# Patient Record
Sex: MALE | Race: WHITE | Employment: OTHER | URBAN - METROPOLITAN AREA
[De-identification: names, ages, dates, MRNs, and addresses within clinical notes are randomized per-mention and may not be internally consistent; named-entity substitution may affect disease eponyms.]

---

## 2020-02-19 ENCOUNTER — APPOINTMENT (OUTPATIENT)
Dept: GENERAL RADIOLOGY | Age: 85
DRG: 280 | End: 2020-02-19
Attending: EMERGENCY MEDICINE
Payer: MEDICARE

## 2020-02-19 ENCOUNTER — HOSPITAL ENCOUNTER (INPATIENT)
Age: 85
LOS: 6 days | Discharge: NURSING, REHAB OR LONG-TERM CARE | DRG: 280 | End: 2020-02-25
Attending: EMERGENCY MEDICINE | Admitting: INTERNAL MEDICINE
Payer: MEDICARE

## 2020-02-19 ENCOUNTER — APPOINTMENT (OUTPATIENT)
Dept: CT IMAGING | Age: 85
DRG: 280 | End: 2020-02-19
Attending: EMERGENCY MEDICINE
Payer: MEDICARE

## 2020-02-19 DIAGNOSIS — M62.82 NON-TRAUMATIC RHABDOMYOLYSIS: ICD-10-CM

## 2020-02-19 DIAGNOSIS — I21.4 NSTEMI (NON-ST ELEVATED MYOCARDIAL INFARCTION) (HCC): Primary | ICD-10-CM

## 2020-02-19 DIAGNOSIS — J18.9 COMMUNITY ACQUIRED PNEUMONIA OF RIGHT LOWER LOBE OF LUNG: ICD-10-CM

## 2020-02-19 LAB
ALBUMIN SERPL-MCNC: 3.5 G/DL (ref 3.5–5)
ALBUMIN/GLOB SERPL: 0.8 {RATIO} (ref 1.1–2.2)
ALP SERPL-CCNC: 95 U/L (ref 45–117)
ALT SERPL-CCNC: 53 U/L (ref 12–78)
AMORPH CRY URNS QL MICRO: ABNORMAL
ANION GAP SERPL CALC-SCNC: 6 MMOL/L (ref 5–15)
APPEARANCE UR: ABNORMAL
APTT PPP: 24.9 SEC (ref 22.1–32)
AST SERPL-CCNC: 135 U/L (ref 15–37)
BACTERIA URNS QL MICRO: NEGATIVE /HPF
BASOPHILS # BLD: 0 K/UL (ref 0–0.1)
BASOPHILS NFR BLD: 0 % (ref 0–1)
BILIRUB SERPL-MCNC: 0.5 MG/DL (ref 0.2–1)
BILIRUB UR QL: NEGATIVE
BUN SERPL-MCNC: 38 MG/DL (ref 6–20)
BUN/CREAT SERPL: 31 (ref 12–20)
CALCIUM SERPL-MCNC: 9.3 MG/DL (ref 8.5–10.1)
CHLORIDE SERPL-SCNC: 100 MMOL/L (ref 97–108)
CK SERPL-CCNC: 3805 U/L (ref 39–308)
CO2 SERPL-SCNC: 27 MMOL/L (ref 21–32)
COLOR UR: ABNORMAL
CREAT SERPL-MCNC: 1.24 MG/DL (ref 0.7–1.3)
DIFFERENTIAL METHOD BLD: ABNORMAL
EOSINOPHIL # BLD: 0 K/UL (ref 0–0.4)
EOSINOPHIL NFR BLD: 0 % (ref 0–7)
EPITH CASTS URNS QL MICRO: ABNORMAL /LPF
ERYTHROCYTE [DISTWIDTH] IN BLOOD BY AUTOMATED COUNT: 14.4 % (ref 11.5–14.5)
GLOBULIN SER CALC-MCNC: 4.3 G/DL (ref 2–4)
GLUCOSE SERPL-MCNC: 157 MG/DL (ref 65–100)
GLUCOSE UR STRIP.AUTO-MCNC: 250 MG/DL
GRAN CASTS URNS QL MICRO: ABNORMAL /LPF
HCT VFR BLD AUTO: 36 % (ref 36.6–50.3)
HGB BLD-MCNC: 12.2 G/DL (ref 12.1–17)
HGB UR QL STRIP: ABNORMAL
IMM GRANULOCYTES # BLD AUTO: 0 K/UL (ref 0–0.04)
IMM GRANULOCYTES NFR BLD AUTO: 0 % (ref 0–0.5)
KETONES UR QL STRIP.AUTO: 15 MG/DL
LEUKOCYTE ESTERASE UR QL STRIP.AUTO: ABNORMAL
LIPASE SERPL-CCNC: 157 U/L (ref 73–393)
LYMPHOCYTES # BLD: 0.6 K/UL (ref 0.8–3.5)
LYMPHOCYTES NFR BLD: 4 % (ref 12–49)
MCH RBC QN AUTO: 30.1 PG (ref 26–34)
MCHC RBC AUTO-ENTMCNC: 33.9 G/DL (ref 30–36.5)
MCV RBC AUTO: 88.9 FL (ref 80–99)
MONOCYTES # BLD: 0.8 K/UL (ref 0–1)
MONOCYTES NFR BLD: 5 % (ref 5–13)
NEUTS BAND NFR BLD MANUAL: 2 %
NEUTS SEG # BLD: 13.9 K/UL (ref 1.8–8)
NEUTS SEG NFR BLD: 89 % (ref 32–75)
NITRITE UR QL STRIP.AUTO: NEGATIVE
NRBC # BLD: 0 K/UL (ref 0–0.01)
NRBC BLD-RTO: 0 PER 100 WBC
PH UR STRIP: 5.5 [PH] (ref 5–8)
PLATELET # BLD AUTO: 344 K/UL (ref 150–400)
PMV BLD AUTO: 9.3 FL (ref 8.9–12.9)
POTASSIUM SERPL-SCNC: 3.8 MMOL/L (ref 3.5–5.1)
PROT SERPL-MCNC: 7.8 G/DL (ref 6.4–8.2)
PROT UR STRIP-MCNC: 300 MG/DL
RBC # BLD AUTO: 4.05 M/UL (ref 4.1–5.7)
RBC #/AREA URNS HPF: ABNORMAL /HPF
RBC MORPH BLD: ABNORMAL
SODIUM SERPL-SCNC: 133 MMOL/L (ref 136–145)
SP GR UR REFRACTOMETRY: 1.02 (ref 1–1.03)
THERAPEUTIC RANGE,PTTT: NORMAL SECS (ref 58–77)
TROPONIN I SERPL-MCNC: 6.5 NG/ML
UA: UC IF INDICATED,UAUC: ABNORMAL
UROBILINOGEN UR QL STRIP.AUTO: 0.2 EU/DL (ref 0.2–1)
WBC # BLD AUTO: 15.3 K/UL (ref 4.1–11.1)
WBC URNS QL MICRO: ABNORMAL /HPF

## 2020-02-19 PROCEDURE — 82550 ASSAY OF CK (CPK): CPT

## 2020-02-19 PROCEDURE — 81001 URINALYSIS AUTO W/SCOPE: CPT

## 2020-02-19 PROCEDURE — 70450 CT HEAD/BRAIN W/O DYE: CPT

## 2020-02-19 PROCEDURE — 74011250636 HC RX REV CODE- 250/636: Performed by: EMERGENCY MEDICINE

## 2020-02-19 PROCEDURE — 71046 X-RAY EXAM CHEST 2 VIEWS: CPT

## 2020-02-19 PROCEDURE — 80053 COMPREHEN METABOLIC PANEL: CPT

## 2020-02-19 PROCEDURE — 96365 THER/PROPH/DIAG IV INF INIT: CPT

## 2020-02-19 PROCEDURE — 93005 ELECTROCARDIOGRAM TRACING: CPT

## 2020-02-19 PROCEDURE — 94761 N-INVAS EAR/PLS OXIMETRY MLT: CPT

## 2020-02-19 PROCEDURE — 83690 ASSAY OF LIPASE: CPT

## 2020-02-19 PROCEDURE — 85025 COMPLETE CBC W/AUTO DIFF WBC: CPT

## 2020-02-19 PROCEDURE — 85730 THROMBOPLASTIN TIME PARTIAL: CPT

## 2020-02-19 PROCEDURE — 65270000029 HC RM PRIVATE

## 2020-02-19 PROCEDURE — 73562 X-RAY EXAM OF KNEE 3: CPT

## 2020-02-19 PROCEDURE — 74011250637 HC RX REV CODE- 250/637: Performed by: EMERGENCY MEDICINE

## 2020-02-19 PROCEDURE — 99285 EMERGENCY DEPT VISIT HI MDM: CPT

## 2020-02-19 PROCEDURE — 84484 ASSAY OF TROPONIN QUANT: CPT

## 2020-02-19 PROCEDURE — 36415 COLL VENOUS BLD VENIPUNCTURE: CPT

## 2020-02-19 RX ORDER — ASPIRIN 325 MG
325 TABLET ORAL
Status: COMPLETED | OUTPATIENT
Start: 2020-02-19 | End: 2020-02-19

## 2020-02-19 RX ORDER — ACETAMINOPHEN 500 MG
1000 TABLET ORAL ONCE
Status: COMPLETED | OUTPATIENT
Start: 2020-02-19 | End: 2020-02-19

## 2020-02-19 RX ORDER — HEPARIN SODIUM 10000 [USP'U]/100ML
12-25 INJECTION, SOLUTION INTRAVENOUS
Status: DISCONTINUED | OUTPATIENT
Start: 2020-02-19 | End: 2020-02-21

## 2020-02-19 RX ORDER — SODIUM CHLORIDE 9 MG/ML
100 INJECTION, SOLUTION INTRAVENOUS ONCE
Status: COMPLETED | OUTPATIENT
Start: 2020-02-19 | End: 2020-02-19

## 2020-02-19 RX ORDER — HEPARIN SODIUM 5000 [USP'U]/ML
4000 INJECTION, SOLUTION INTRAVENOUS; SUBCUTANEOUS AS NEEDED
Status: DISCONTINUED | OUTPATIENT
Start: 2020-02-20 | End: 2020-02-21

## 2020-02-19 RX ORDER — ACETAMINOPHEN 500 MG
1000 TABLET ORAL
Status: COMPLETED | OUTPATIENT
Start: 2020-02-19 | End: 2020-02-19

## 2020-02-19 RX ORDER — HEPARIN SODIUM 5000 [USP'U]/ML
2000 INJECTION, SOLUTION INTRAVENOUS; SUBCUTANEOUS AS NEEDED
Status: DISCONTINUED | OUTPATIENT
Start: 2020-02-20 | End: 2020-02-21

## 2020-02-19 RX ADMIN — HEPARIN SODIUM 12 UNITS/KG/HR: 10000 INJECTION, SOLUTION INTRAVENOUS at 22:18

## 2020-02-19 RX ADMIN — SODIUM CHLORIDE 100 ML/HR: 900 INJECTION, SOLUTION INTRAVENOUS at 21:02

## 2020-02-19 RX ADMIN — ACETAMINOPHEN 1000 MG: 500 TABLET ORAL at 23:17

## 2020-02-19 RX ADMIN — SODIUM CHLORIDE 500 ML: 900 INJECTION, SOLUTION INTRAVENOUS at 21:01

## 2020-02-19 RX ADMIN — ASPIRIN 325 MG: 325 TABLET, FILM COATED ORAL at 19:22

## 2020-02-19 RX ADMIN — ACETAMINOPHEN 1000 MG: 500 TABLET ORAL at 17:53

## 2020-02-19 NOTE — ED PROVIDER NOTES
EMERGENCY DEPARTMENT HISTORY AND PHYSICAL EXAM      Date: 2/19/2020  Patient Name: Rupert Cortes. Patient Age and Sex: 80 y.o. male     History of Presenting Illness     Chief Complaint   Patient presents with    Fall    Altered mental status       History Provided By: Patient    HPI: Rupert Frederick Is a 26-year-old male with past medical history of dementia presenting today after a fall. The patient does not remember falling, but was found by 1 of his caregivers on the bathroom floor. The patient states he is unsure of exactly when he fell, but the last time they noted that he was okay was last night. He is complaining of right-sided knee pain. He had his right leg curled underneath his body. He denies any chest pain or shortness of breath. No headache. The family states that he is slightly confused more than usual.  He does typically wear a condom catheter due to urinary incontinence. Otherwise the patient complains of no hip pain, abdominal pain, or any other symptoms at this time. There are no other complaints, changes, or physical findings at this time. PCP: Darci Riedel, MD    No current facility-administered medications on file prior to encounter. No current outpatient medications on file prior to encounter. Past History     Past Medical History:  No past medical history on file. Past Surgical History:  No past surgical history on file. Family History:  No family history on file. Social History:  Social History     Tobacco Use    Smoking status: Not on file   Substance Use Topics    Alcohol use: Not on file    Drug use: Not on file       Allergies:  No Known Allergies      Review of Systems   Constitutional: No  fever,  No  headache  Skin: No  rash, No  jaundice  HEENT: No  nasal congestion, No  eye drainage.    Resp: No cough,  No  wheezing  CV: No chest pain, No  palpitations  GI: No vomiting,  No  diarrhea.,  No  constipation  : No dysuria,  No hematuria  MSK: + joint pain,  +  trauma  Neuro: No numbness, No  tingling  Psych: No suicidal, No  paranoid      Physical Exam     Patient Vitals for the past 12 hrs:   Temp Pulse Resp BP SpO2   02/19/20 2230    145/79 94 %   02/19/20 2200  89 17 144/69 95 %   02/19/20 2130  93 18 141/77 95 %   02/19/20 2100  90 20 151/81 95 %   02/19/20 1945  88 21 145/85 95 %   02/19/20 1930  94 18 147/86 96 %   02/19/20 1730  89 18  94 %   02/19/20 1716     96 %   02/19/20 1715    143/79    02/19/20 1708 98.1 °F (36.7 °C) 91 16 143/79 94 %     General: alert, No acute distress  Eyes: EOMI, normal conjunctiva  Head: No evidence of external trauma to the head. ENT: moist mucous membranes. Neck: Active, full ROM of neck. Skin: Blistering on the right lower extremity, with multiple skin abrasions and superficial blisters     Lungs: Equal chest expansion. no respiratory distress. clear to auscultation bilaterally No accessory muscle usage  Heart: regular rate     no peripheral edema   2+ radial pulses and DPs bilaterally  Abd:  non distended soft, nontender. No rebound tenderness. No guarding  Back: Full ROM  MSK: Palpation of the right knee, decreased range of motion due to pain, no hip pain, no arm pain  Neuro: Person, Place, Time and Situation; normal speech;   Psych: Cooperative with exam; Appropriate mood and affect             Diagnostic Study Results     Labs -     Recent Results (from the past 12 hour(s))   CBC WITH AUTOMATED DIFF    Collection Time: 02/19/20  5:46 PM   Result Value Ref Range    WBC 15.3 (H) 4.1 - 11.1 K/uL    RBC 4.05 (L) 4.10 - 5.70 M/uL    HGB 12.2 12.1 - 17.0 g/dL    HCT 36.0 (L) 36.6 - 50.3 %    MCV 88.9 80.0 - 99.0 FL    MCH 30.1 26.0 - 34.0 PG    MCHC 33.9 30.0 - 36.5 g/dL    RDW 14.4 11.5 - 14.5 %    PLATELET 849 142 - 968 K/uL    MPV 9.3 8.9 - 12.9 FL    NRBC 0.0 0  WBC    ABSOLUTE NRBC 0.00 0.00 - 0.01 K/uL    NEUTROPHILS 89 (H) 32 - 75 %    BAND NEUTROPHILS 2 % LYMPHOCYTES 4 (L) 12 - 49 %    MONOCYTES 5 5 - 13 %    EOSINOPHILS 0 0 - 7 %    BASOPHILS 0 0 - 1 %    IMMATURE GRANULOCYTES 0 0.0 - 0.5 %    ABS. NEUTROPHILS 13.9 (H) 1.8 - 8.0 K/UL    ABS. LYMPHOCYTES 0.6 (L) 0.8 - 3.5 K/UL    ABS. MONOCYTES 0.8 0.0 - 1.0 K/UL    ABS. EOSINOPHILS 0.0 0.0 - 0.4 K/UL    ABS. BASOPHILS 0.0 0.0 - 0.1 K/UL    ABS. IMM. GRANS. 0.0 0.00 - 0.04 K/UL    DF MANUAL      RBC COMMENTS NORMOCYTIC, NORMOCHROMIC     METABOLIC PANEL, COMPREHENSIVE    Collection Time: 02/19/20  5:46 PM   Result Value Ref Range    Sodium 133 (L) 136 - 145 mmol/L    Potassium 3.8 3.5 - 5.1 mmol/L    Chloride 100 97 - 108 mmol/L    CO2 27 21 - 32 mmol/L    Anion gap 6 5 - 15 mmol/L    Glucose 157 (H) 65 - 100 mg/dL    BUN 38 (H) 6 - 20 MG/DL    Creatinine 1.24 0.70 - 1.30 MG/DL    BUN/Creatinine ratio 31 (H) 12 - 20      GFR est AA >60 >60 ml/min/1.73m2    GFR est non-AA 55 (L) >60 ml/min/1.73m2    Calcium 9.3 8.5 - 10.1 MG/DL    Bilirubin, total 0.5 0.2 - 1.0 MG/DL    ALT (SGPT) 53 12 - 78 U/L    AST (SGOT) 135 (H) 15 - 37 U/L    Alk.  phosphatase 95 45 - 117 U/L    Protein, total 7.8 6.4 - 8.2 g/dL    Albumin 3.5 3.5 - 5.0 g/dL    Globulin 4.3 (H) 2.0 - 4.0 g/dL    A-G Ratio 0.8 (L) 1.1 - 2.2     CK    Collection Time: 02/19/20  5:46 PM   Result Value Ref Range    CK 3,805 (H) 39 - 308 U/L   LIPASE    Collection Time: 02/19/20  5:46 PM   Result Value Ref Range    Lipase 157 73 - 393 U/L   TROPONIN I    Collection Time: 02/19/20  5:46 PM   Result Value Ref Range    Troponin-I, Qt. 6.50 (H) <0.05 ng/mL   EKG, 12 LEAD, INITIAL    Collection Time: 02/19/20  5:53 PM   Result Value Ref Range    Ventricular Rate 88 BPM    Atrial Rate 88 BPM    P-R Interval 280 ms    QRS Duration 120 ms    Q-T Interval 402 ms    QTC Calculation (Bezet) 486 ms    Calculated P Axis 75 degrees    Calculated R Axis -60 degrees    Calculated T Axis 54 degrees    Diagnosis       Sinus rhythm with 1st degree AV block  Possible Left atrial enlargement  Left anterior fascicular block  Left ventricular hypertrophy with QRS widening and repolarization abnormality  Anteroseptal infarct , age undetermined  When compared with ECG of 29-MAR-2000 09:58,  Previous ECG has undetermined rhythm, needs review  Left anterior fascicular block is now present  Anteroseptal infarct is now present  QT has lengthened     URINALYSIS W/ REFLEX CULTURE    Collection Time: 02/19/20  7:23 PM   Result Value Ref Range    Color YELLOW/STRAW      Appearance CLOUDY (A) CLEAR      Specific gravity 1.022 1.003 - 1.030      pH (UA) 5.5 5.0 - 8.0      Protein 300 (A) NEG mg/dL    Glucose 250 (A) NEG mg/dL    Ketone 15 (A) NEG mg/dL    Bilirubin NEGATIVE  NEG      Blood LARGE (A) NEG      Urobilinogen 0.2 0.2 - 1.0 EU/dL    Nitrites NEGATIVE  NEG      Leukocyte Esterase SMALL (A) NEG      WBC 0-4 /hpf    RBC 20-50 /hpf    Epithelial cells MODERATE /lpf    Bacteria NEGATIVE  /hpf    UA:UC IF INDICATED CULTURE NOT INDICATED BY UA RESULT      Amorphous Crystals 1+     Granular cast 0-2 /lpf   PTT    Collection Time: 02/19/20  9:51 PM   Result Value Ref Range    aPTT 24.9 22.1 - 32.0 sec    aPTT, therapeutic range     58.0 - 77.0 SECS       Radiologic Studies -   XR CHEST PA LAT   Final Result   IMPRESSION: RLL airspace disease. XR KNEE RT 3 V   Final Result   IMPRESSION: No fracture. Small joint effusion. CT HEAD WO CONT   Final Result   IMPRESSION: No acute intracranial disease. CT Results  (Last 48 hours)               02/19/20 1858  CT HEAD WO CONT Final result    Impression:  IMPRESSION: No acute intracranial disease. Narrative:  EXAM: Head CT without Contrast:    CT dose reduction was achieved through use of a standardized protocol tailored   for this examination and automatic exposure control for dose modulation. INDICATION:  AMS, fall        Unenhanced Head CT shows no mass, bleed, shift, hydrocephalus or extra-axial   fluid collection.  No acute infarct is apparent. Cerebral white matter   hypodensity is noted, favoring chronic microangiopathy. Bone windows are   unremarkable. CXR Results  (Last 48 hours)               02/19/20 2014  XR CHEST PA LAT Final result    Impression:  IMPRESSION: RLL airspace disease. Narrative:  INDICATION: AMS       EXAM: CXR 2 Views. COMPARISON: 2015. FINDINGS: Frontal and lateral views of the chest show right lower lobe airspace   disease. Otherwise no change. Heart size is normal. There is no pulmonary edema. There is no evident pneumothorax, adenopathy or pleural effusion. Medical Decision Making     Differential Diagnosis: ACS, subarachnoid, rhabdomyolysis, electrolyte arrangement, urinary tract infection    I reviewed the vital signs, available nursing notes, past medical history, past surgical history, family history and social history and old medical records. On my interpretation, Laboratory workup is significant for elevated white blood cell count 15.3, elevated troponin 6.5, CK is 3805,  On my interpretation of the radiology studies CT head without acute abnormality, right knee with an effusion, but no fracture, chest x-ray shows right lower lobe airspace disease  On my interpretation of the EKG sinus rhythm with first-degree AV block, rate is 88, QTc 46, no ST elevation, Q waves seen in lead V3, otherwise no ST elevation or depression    Management/ED course: Patient presents today after being found on the ground at home. Found to have multiple abnormalities including evidence of N STEMI with a troponin of 6.5. The patient also had an elevated CK. He does have blistering on the right lower extremity. Additionally is found to have a right lower lobe infiltrate. Patient will be treated with antibiotics, IV fluids, aspirin and heparin.   Ultimately I spoke with cardiology who agrees with the plan for heparin and will see the patient while they are admitted. He is admitted to the hospitalist service. ED Course:   Initial assessment performed. The patients presenting problems have been discussed, and they are in agreement with the care plan formulated and outlined with them. I have encouraged them to ask questions as they arise throughout their visit. CONSULT NOTE:   11:51 PM  I spoke with Kris Land NP  Specialty: Cardiology  Discussed pt's hx, disposition, and available diagnostic and imaging results. Reviewed care plans. Plan: Agree with heparin, will see in consultation. Procedures:  CRITICAL CARE NOTE :    11:52 PM    IMPENDING DETERIORATION -Respiratory and Cardiovascular  ASSOCIATED RISK FACTORS - Metabolic changes and Vascular Compromise  MANAGEMENT- Bedside Assessment  INTERPRETATION -  Xrays, CT Scan, ECG and Blood Pressure  INTERVENTIONS - Metobolic interventions  CASE REVIEW - Hospitalist, Medical Sub-Specialist and Nursing  TREATMENT RESPONSE -Improved  PERFORMED BY - Self    NOTES   :    I have spent 45 minutes of critical care time involved in lab review, consultations with specialist, family decision- making, bedside attention and documentation. During this entire length of time I was immediately available to the patient . Disposition: Admitted    Admission Note:  Patient is being admitted to the hospital by Service: Hospitalist.  The results of their tests and reasons for their admission have been discussed with them and available family. They convey agreement and understanding for the need to be admitted and for their admission diagnosis. Diagnosis     Clinical Impression:   1. NSTEMI (non-ST elevated myocardial infarction) ([facility] [address]    2. Traumatic rhabdomyolysis, initial encounter [facility])        Attestations:  Kaylee Zambrano MD        Please note that this dictation was completed with AwesomeTouch, the computer voice recognition software.   Quite often unanticipated grammatical, syntax, homophones, and other interpretive errors are inadvertently transcribed by the computer software. Please disregard these errors. Please excuse any errors that have escaped final proofreading. Thank you.

## 2020-02-19 NOTE — ED TRIAGE NOTES
Pt BIB EMS after falling in bathroom. Per report pt went to the bathroom and was taking a while. Family kept calling through the door asking if he was okay and pt kept repsonding yes so they left him. After a few hours they opened the door and found pt on the floor. Family believes pt had been on the floor for a few hours. Pt is oriented to self at baseline and is able to answer some questions. Family is headed back. VSS on monitor x2 at this time. Pt changing into gown at this itme.

## 2020-02-20 ENCOUNTER — APPOINTMENT (OUTPATIENT)
Dept: GENERAL RADIOLOGY | Age: 85
DRG: 280 | End: 2020-02-20
Attending: INTERNAL MEDICINE
Payer: MEDICARE

## 2020-02-20 ENCOUNTER — APPOINTMENT (OUTPATIENT)
Dept: NON INVASIVE DIAGNOSTICS | Age: 85
DRG: 280 | End: 2020-02-20
Attending: INTERNAL MEDICINE
Payer: MEDICARE

## 2020-02-20 LAB
ALBUMIN SERPL-MCNC: 2.6 G/DL (ref 3.5–5)
ALBUMIN/GLOB SERPL: 0.7 {RATIO} (ref 1.1–2.2)
ALP SERPL-CCNC: 71 U/L (ref 45–117)
ALT SERPL-CCNC: 80 U/L (ref 12–78)
ANION GAP SERPL CALC-SCNC: 8 MMOL/L (ref 5–15)
APTT PPP: 38.1 SEC (ref 22.1–32)
APTT PPP: 49.4 SEC (ref 22.1–32)
APTT PPP: 99.5 SEC (ref 22.1–32)
AST SERPL-CCNC: 423 U/L (ref 15–37)
ATRIAL RATE: 88 BPM
AV PEAK GRADIENT: 18.03 MMHG
AV VELOCITY RATIO: 0.68
AV VTI RATIO: 0.8
BASOPHILS # BLD: 0 K/UL (ref 0–0.1)
BASOPHILS NFR BLD: 0 % (ref 0–1)
BILIRUB SERPL-MCNC: 0.3 MG/DL (ref 0.2–1)
BUN SERPL-MCNC: 32 MG/DL (ref 6–20)
BUN/CREAT SERPL: 36 (ref 12–20)
CALCIUM SERPL-MCNC: 8.2 MG/DL (ref 8.5–10.1)
CALCULATED P AXIS, ECG09: 75 DEGREES
CALCULATED R AXIS, ECG10: -60 DEGREES
CALCULATED T AXIS, ECG11: 54 DEGREES
CHLORIDE SERPL-SCNC: 106 MMOL/L (ref 97–108)
CK SERPL-CCNC: ABNORMAL U/L (ref 39–308)
CO2 SERPL-SCNC: 25 MMOL/L (ref 21–32)
CREAT SERPL-MCNC: 0.9 MG/DL (ref 0.7–1.3)
DIAGNOSIS, 93000: NORMAL
DIFFERENTIAL METHOD BLD: ABNORMAL
ECHO AO ROOT DIAM: 3.24 CM
ECHO AV AREA PEAK VELOCITY: 2.1 CM2
ECHO AV AREA VTI: 2.3 CM2
ECHO AV MEAN GRADIENT: 3.3 MMHG
ECHO AV MEAN VELOCITY: 0.81 M/S
ECHO AV PEAK GRADIENT: 7.7 MMHG
ECHO AV PEAK VELOCITY: 138.36 CM/S
ECHO AV REGURGITANT PHT: 1209.5 CM
ECHO AV VTI: 22.71 CM
ECHO EST RA PRESSURE: 10 MMHG
ECHO LA AREA 4C: 27.2 CM2
ECHO LA MAJOR AXIS: 3.41 CM
ECHO LA TO AORTIC ROOT RATIO: 1.06
ECHO LA VOL 4C: 85.75 ML (ref 18–58)
ECHO LA VOLUME INDEX A4C: 44.83 ML/M2 (ref 16–28)
ECHO LV E' LATERAL VELOCITY: 4.41 CM/S
ECHO LV E' SEPTAL VELOCITY: 2.8 CM/S
ECHO LV EDV A4C: 95.7 ML
ECHO LV EDV INDEX A4C: 50 ML/M2
ECHO LV EJECTION FRACTION A4C: 50 %
ECHO LV ESV A4C: 47.7 ML
ECHO LV ESV INDEX A4C: 24.9 ML/M2
ECHO LV INTERNAL DIMENSION DIASTOLIC: 4.2 CM (ref 4.2–5.9)
ECHO LV INTERNAL DIMENSION SYSTOLIC: 2.84 CM
ECHO LV IVSD: 1.36 CM (ref 0.6–1)
ECHO LV MASS 2D: 250.1 G (ref 88–224)
ECHO LV MASS INDEX 2D: 130.7 G/M2 (ref 49–115)
ECHO LV POSTERIOR WALL DIASTOLIC: 1.34 CM (ref 0.6–1)
ECHO LVOT CARDIAC OUTPUT: 3.6 L/MIN
ECHO LVOT DIAM: 1.99 CM
ECHO LVOT PEAK GRADIENT: 3.6 MMHG
ECHO LVOT PEAK VELOCITY: 94.49 CM/S
ECHO LVOT SV: 53.3 ML
ECHO LVOT VTI: 17.11 CM
ECHO MV A VELOCITY: 57.44 CM/S
ECHO MV AREA PHT: 5.4 CM2
ECHO MV AREA VTI: 2.5 CM2
ECHO MV E DECELERATION TIME (DT): 136.1 MS
ECHO MV E VELOCITY: 45.5 CM/S
ECHO MV E/A RATIO: 0.79
ECHO MV E/E' LATERAL: 10.32
ECHO MV E/E' RATIO (AVERAGED): 13.28
ECHO MV E/E' SEPTAL: 16.25
ECHO MV MAX VELOCITY: 89.54 CM/S
ECHO MV MEAN GRADIENT: 1 MMHG
ECHO MV MEAN INFLOW VELOCITY: 0.44 M/S
ECHO MV PEAK GRADIENT: 3.2 MMHG
ECHO MV PRESSURE HALF TIME (PHT): 40.8 MS
ECHO MV VTI: 21.6 CM
ECHO PULMONARY ARTERY SYSTOLIC PRESSURE (PASP): 25.6 MMHG
ECHO PV MAX VELOCITY: 98.22 CM/S
ECHO PV MEAN GRADIENT: 2.5 MMHG
ECHO PV PEAK GRADIENT: 3.9 MMHG
ECHO PV VTI: 20.95 CM
ECHO RA AREA 4C: 18.23 CM2
ECHO RIGHT VENTRICULAR SYSTOLIC PRESSURE (RVSP): 25.6 MMHG
ECHO TV REGURGITANT MAX VELOCITY: 197.21 CM/S
ECHO TV REGURGITANT PEAK GRADIENT: 15.6 MMHG
EOSINOPHIL # BLD: 0 K/UL (ref 0–0.4)
EOSINOPHIL NFR BLD: 0 % (ref 0–7)
ERYTHROCYTE [DISTWIDTH] IN BLOOD BY AUTOMATED COUNT: 14.6 % (ref 11.5–14.5)
GLOBULIN SER CALC-MCNC: 3.6 G/DL (ref 2–4)
GLUCOSE SERPL-MCNC: 104 MG/DL (ref 65–100)
HCT VFR BLD AUTO: 34.1 % (ref 36.6–50.3)
HGB BLD-MCNC: 11.6 G/DL (ref 12.1–17)
IMM GRANULOCYTES # BLD AUTO: 0.1 K/UL (ref 0–0.04)
IMM GRANULOCYTES NFR BLD AUTO: 1 % (ref 0–0.5)
LACTATE SERPL-SCNC: 1.2 MMOL/L (ref 0.4–2)
LVFS 2D: 32.43 %
LVOT MG: 1.66 MMHG
LVOT MV: 0.59 CM/S
LYMPHOCYTES # BLD: 0.8 K/UL (ref 0.8–3.5)
LYMPHOCYTES NFR BLD: 6 % (ref 12–49)
MCH RBC QN AUTO: 30.1 PG (ref 26–34)
MCHC RBC AUTO-ENTMCNC: 34 G/DL (ref 30–36.5)
MCV RBC AUTO: 88.6 FL (ref 80–99)
MONOCYTES # BLD: 1.3 K/UL (ref 0–1)
MONOCYTES NFR BLD: 11 % (ref 5–13)
MV DEC SLOPE: 3.34
NEUTS SEG # BLD: 9.9 K/UL (ref 1.8–8)
NEUTS SEG NFR BLD: 82 % (ref 32–75)
NRBC # BLD: 0 K/UL (ref 0–0.01)
NRBC BLD-RTO: 0 PER 100 WBC
P-R INTERVAL, ECG05: 280 MS
PHOSPHATE SERPL-MCNC: 3.1 MG/DL (ref 2.6–4.7)
PISA AR MAX VEL: 212.31 CM/S
PLATELET # BLD AUTO: 308 K/UL (ref 150–400)
PMV BLD AUTO: 9.9 FL (ref 8.9–12.9)
POTASSIUM SERPL-SCNC: 3.4 MMOL/L (ref 3.5–5.1)
PROT SERPL-MCNC: 6.2 G/DL (ref 6.4–8.2)
PULMONARY ARTERY END DIASTOLIC PRESSURE: 15.6 MMHG
PULMONARY ARTERY MEAN PRESURE: 18.9 MMHG
PV END DIASTOLIC VELOCITY: 1.2 MMHG
Q-T INTERVAL, ECG07: 402 MS
QRS DURATION, ECG06: 120 MS
QTC CALCULATION (BEZET), ECG08: 486 MS
RBC # BLD AUTO: 3.85 M/UL (ref 4.1–5.7)
SODIUM SERPL-SCNC: 139 MMOL/L (ref 136–145)
THERAPEUTIC RANGE,PTTT: ABNORMAL SECS (ref 58–77)
TROPONIN I SERPL-MCNC: 14.2 NG/ML
TROPONIN I SERPL-MCNC: 14.2 NG/ML
VENTRICULAR RATE, ECG03: 88 BPM
WBC # BLD AUTO: 12.1 K/UL (ref 4.1–11.1)

## 2020-02-20 PROCEDURE — 74011250636 HC RX REV CODE- 250/636: Performed by: EMERGENCY MEDICINE

## 2020-02-20 PROCEDURE — 74011250637 HC RX REV CODE- 250/637: Performed by: INTERNAL MEDICINE

## 2020-02-20 PROCEDURE — 73521 X-RAY EXAM HIPS BI 2 VIEWS: CPT

## 2020-02-20 PROCEDURE — 80053 COMPREHEN METABOLIC PANEL: CPT

## 2020-02-20 PROCEDURE — 84484 ASSAY OF TROPONIN QUANT: CPT

## 2020-02-20 PROCEDURE — 82550 ASSAY OF CK (CPK): CPT

## 2020-02-20 PROCEDURE — 74011250636 HC RX REV CODE- 250/636: Performed by: INTERNAL MEDICINE

## 2020-02-20 PROCEDURE — 74011000258 HC RX REV CODE- 258: Performed by: INTERNAL MEDICINE

## 2020-02-20 PROCEDURE — 85730 THROMBOPLASTIN TIME PARTIAL: CPT

## 2020-02-20 PROCEDURE — 36415 COLL VENOUS BLD VENIPUNCTURE: CPT

## 2020-02-20 PROCEDURE — 74011000258 HC RX REV CODE- 258: Performed by: EMERGENCY MEDICINE

## 2020-02-20 PROCEDURE — 93306 TTE W/DOPPLER COMPLETE: CPT

## 2020-02-20 PROCEDURE — 65660000000 HC RM CCU STEPDOWN

## 2020-02-20 PROCEDURE — 85025 COMPLETE CBC W/AUTO DIFF WBC: CPT

## 2020-02-20 PROCEDURE — 84100 ASSAY OF PHOSPHORUS: CPT

## 2020-02-20 PROCEDURE — 83605 ASSAY OF LACTIC ACID: CPT

## 2020-02-20 PROCEDURE — 87040 BLOOD CULTURE FOR BACTERIA: CPT

## 2020-02-20 RX ORDER — SODIUM CHLORIDE 0.9 % (FLUSH) 0.9 %
5-40 SYRINGE (ML) INJECTION AS NEEDED
Status: DISCONTINUED | OUTPATIENT
Start: 2020-02-20 | End: 2020-02-25 | Stop reason: HOSPADM

## 2020-02-20 RX ORDER — SODIUM BICARBONATE 650 MG/1
325 TABLET ORAL 2 TIMES DAILY
Status: DISCONTINUED | OUTPATIENT
Start: 2020-02-20 | End: 2020-02-21

## 2020-02-20 RX ORDER — GUAIFENESIN 100 MG/5ML
400 SOLUTION ORAL 3 TIMES DAILY
Status: DISCONTINUED | OUTPATIENT
Start: 2020-02-20 | End: 2020-02-25 | Stop reason: HOSPADM

## 2020-02-20 RX ORDER — POTASSIUM CHLORIDE 7.45 MG/ML
10 INJECTION INTRAVENOUS ONCE
Status: COMPLETED | OUTPATIENT
Start: 2020-02-20 | End: 2020-02-20

## 2020-02-20 RX ORDER — ZINC GLUCONATE 10 MG
LOZENGE ORAL AS NEEDED
COMMUNITY
End: 2020-02-25

## 2020-02-20 RX ORDER — ASPIRIN 81 MG/1
81 TABLET ORAL DAILY
Status: DISCONTINUED | OUTPATIENT
Start: 2020-02-20 | End: 2020-02-25 | Stop reason: HOSPADM

## 2020-02-20 RX ORDER — SODIUM CHLORIDE 450 MG/100ML
75 INJECTION, SOLUTION INTRAVENOUS CONTINUOUS
Status: DISPENSED | OUTPATIENT
Start: 2020-02-20 | End: 2020-02-22

## 2020-02-20 RX ORDER — ONDANSETRON 2 MG/ML
4 INJECTION INTRAMUSCULAR; INTRAVENOUS
Status: DISCONTINUED | OUTPATIENT
Start: 2020-02-20 | End: 2020-02-25 | Stop reason: HOSPADM

## 2020-02-20 RX ORDER — SODIUM CHLORIDE 0.9 % (FLUSH) 0.9 %
5-40 SYRINGE (ML) INJECTION EVERY 8 HOURS
Status: DISCONTINUED | OUTPATIENT
Start: 2020-02-20 | End: 2020-02-25 | Stop reason: HOSPADM

## 2020-02-20 RX ORDER — METOPROLOL TARTRATE 25 MG/1
12.5 TABLET, FILM COATED ORAL EVERY 12 HOURS
Status: DISCONTINUED | OUTPATIENT
Start: 2020-02-20 | End: 2020-02-21

## 2020-02-20 RX ORDER — BISACODYL 5 MG
5 TABLET, DELAYED RELEASE (ENTERIC COATED) ORAL DAILY PRN
Status: DISCONTINUED | OUTPATIENT
Start: 2020-02-20 | End: 2020-02-25 | Stop reason: HOSPADM

## 2020-02-20 RX ORDER — ACETAMINOPHEN 325 MG/1
650 TABLET ORAL
Status: DISCONTINUED | OUTPATIENT
Start: 2020-02-20 | End: 2020-02-25 | Stop reason: HOSPADM

## 2020-02-20 RX ORDER — IPRATROPIUM BROMIDE AND ALBUTEROL SULFATE 2.5; .5 MG/3ML; MG/3ML
3 SOLUTION RESPIRATORY (INHALATION)
Status: DISCONTINUED | OUTPATIENT
Start: 2020-02-20 | End: 2020-02-25 | Stop reason: HOSPADM

## 2020-02-20 RX ORDER — ATORVASTATIN CALCIUM 10 MG/1
10 TABLET, FILM COATED ORAL DAILY
Status: CANCELLED | OUTPATIENT
Start: 2020-02-20

## 2020-02-20 RX ADMIN — POTASSIUM CHLORIDE 10 MEQ: 7.46 INJECTION, SOLUTION INTRAVENOUS at 09:49

## 2020-02-20 RX ADMIN — Medication 10 ML: at 22:23

## 2020-02-20 RX ADMIN — AZITHROMYCIN MONOHYDRATE 500 MG: 500 INJECTION, POWDER, LYOPHILIZED, FOR SOLUTION INTRAVENOUS at 01:28

## 2020-02-20 RX ADMIN — HEPARIN SODIUM 13 UNITS/KG/HR: 10000 INJECTION, SOLUTION INTRAVENOUS at 22:23

## 2020-02-20 RX ADMIN — Medication 10 ML: at 00:56

## 2020-02-20 RX ADMIN — ACETAMINOPHEN 650 MG: 325 TABLET ORAL at 17:23

## 2020-02-20 RX ADMIN — GUAIFENESIN 400 MG: 200 SOLUTION ORAL at 17:22

## 2020-02-20 RX ADMIN — ACETAMINOPHEN 650 MG: 325 TABLET ORAL at 22:22

## 2020-02-20 RX ADMIN — GUAIFENESIN 400 MG: 200 SOLUTION ORAL at 22:22

## 2020-02-20 RX ADMIN — ASPIRIN 81 MG: 81 TABLET ORAL at 10:57

## 2020-02-20 RX ADMIN — ACETAMINOPHEN 650 MG: 325 TABLET ORAL at 06:41

## 2020-02-20 RX ADMIN — Medication 10 ML: at 04:33

## 2020-02-20 RX ADMIN — SODIUM CHLORIDE 75 ML/HR: 450 INJECTION, SOLUTION INTRAVENOUS at 11:11

## 2020-02-20 RX ADMIN — HEPARIN SODIUM 4000 UNITS: 5000 INJECTION INTRAVENOUS; SUBCUTANEOUS at 06:27

## 2020-02-20 RX ADMIN — AZITHROMYCIN MONOHYDRATE 500 MG: 500 INJECTION, POWDER, LYOPHILIZED, FOR SOLUTION INTRAVENOUS at 23:31

## 2020-02-20 RX ADMIN — CEFTRIAXONE SODIUM 2 G: 2 INJECTION, POWDER, FOR SOLUTION INTRAMUSCULAR; INTRAVENOUS at 23:31

## 2020-02-20 RX ADMIN — METOPROLOL TARTRATE 12.5 MG: 25 TABLET ORAL at 11:19

## 2020-02-20 RX ADMIN — SODIUM BICARBONATE 325 MG: 650 TABLET ORAL at 11:19

## 2020-02-20 RX ADMIN — METOPROLOL TARTRATE 12.5 MG: 25 TABLET ORAL at 22:22

## 2020-02-20 RX ADMIN — CEFTRIAXONE SODIUM 2 G: 2 INJECTION, POWDER, FOR SOLUTION INTRAMUSCULAR; INTRAVENOUS at 00:54

## 2020-02-20 RX ADMIN — HEPARIN SODIUM 2000 UNITS: 5000 INJECTION INTRAVENOUS; SUBCUTANEOUS at 23:44

## 2020-02-20 RX ADMIN — ACETAMINOPHEN 650 MG: 325 TABLET ORAL at 02:06

## 2020-02-20 RX ADMIN — SODIUM CHLORIDE 75 ML/HR: 450 INJECTION, SOLUTION INTRAVENOUS at 22:23

## 2020-02-20 RX ADMIN — SODIUM BICARBONATE 325 MG: 650 TABLET ORAL at 17:23

## 2020-02-20 RX ADMIN — Medication 10 ML: at 17:23

## 2020-02-20 NOTE — PROGRESS NOTES
Problem: Pressure Injury - Risk of  Goal: *Prevention of pressure injury  Description  Document Artem Scale and appropriate interventions in the flowsheet.   Outcome: Progressing Towards Goal  Note: Pressure Injury Interventions:  Sensory Interventions: Assess changes in LOC    Moisture Interventions: Absorbent underpads    Activity Interventions: Assess need for specialty bed    Mobility Interventions: Assess need for specialty bed    Nutrition Interventions: Document food/fluid/supplement intake    Friction and Shear Interventions: Apply protective barrier, creams and emollients

## 2020-02-20 NOTE — H&P
Hospitalist Admission Note    NAME: Deanna Hays :  5/10/1929   MRN:  [id number]     Date/Time:  2020 6:08 AM    Patient PCP: Maricruz Stevenson MD  ______________________________________________________________________  Given the patient's current clinical presentation, I have a high level of concern for decompensation if discharged from the emergency department. Complex decision making was performed, which includes reviewing the patient's available past medical records, laboratory results, and x-ray films. My assessment of this patient's clinical condition and my plan of care is as follows. Assessment / Plan:  NSTEMI   Admit patient to IVC  Start patient on heparin drip  Cardiology consultationfollow-up strep troponin  Keep patient n.p.o. Rhabdomyolysis  Start patient on IV fluid  Follow-up repeat CPK    Pneumonia  Start patient on IV antibiotic Rocephin and azithromycin  Follow-up blood culture    HTN   - continue home med     Hypercholesteremia  -continue Statin        Code Status: Full   Surrogate Decision Maker:Priyanka Veronica    DVT Prophylaxis: Heparin   GI Prophylaxis: not indicated          Subjective:   CHIEF COMPLAINT: s/p fall     HISTORY OF PRESENT ILLNESS:     80year old male from home PMHX with Dementia ,HTN , Hypercholesteremia presented to the Hospital s/p fall patient was found on the bathroom floor by caregiver patient unsure what happened and for how long he been down, patient denies any chest pain denies any complaint, blood work was significant for elevated white blood cell count 15.3, troponin was noticed to be elevated 6.50. We were asked to admit for work up and evaluation of the above problems. Past Medical History:   Diagnosis Date    High blood pressure     High cholesterol     Prostate cancer [facility])         History reviewed. No pertinent surgical history.     Social History     Tobacco Use    Smoking status: Not on file Substance Use Topics    Alcohol use: Not on file        History reviewed. family history. HTN   No Known Allergies     Prior to Admission medications    Medication Sig Start Date End Date Taking? Authorizing Provider   LISINOPRIL PO Take  by mouth. Yes Other, MD Inez   SIMVASTATIN PO Take  by mouth. Yes Other, MD Inez   magnesium 250 mg tab Take  by mouth as needed. Indications: leg cramps   Yes Provider, Historical       REVIEW OF SYSTEMS:     I am not able to complete the review of systems because:    The patient is intubated and sedated    The patient has altered mental status due to his acute medical problems    The patient has baseline aphasia from prior stroke(s)    The patient has baseline dementia and is not reliable historian    The patient is in acute medical distress and unable to provide information           Total of 12 systems reviewed as follows:       POSITIVE= underlined text  Negative = text not underlined  General:  fever, chills, sweats, generalized weakness, weight loss/gain,      loss of appetite   Eyes:    blurred vision, eye pain, loss of vision, double vision  ENT:    rhinorrhea, pharyngitis   Respiratory:   cough, sputum production, SOB, PRADHAN, wheezing, pleuritic pain   Cardiology:   chest pain, palpitations, orthopnea, PND, edema, syncope   Gastrointestinal:  abdominal pain , N/V, diarrhea, dysphagia, constipation, bleeding   Genitourinary:  frequency, urgency, dysuria, hematuria, incontinence   Muskuloskeletal :  arthralgia, myalgia, back pain  Hematology:  easy bruising, nose or gum bleeding, lymphadenopathy   Dermatological: rash, ulceration, pruritis, color change / jaundice  Endocrine:   hot flashes or polydipsia   Neurological:  headache, dizziness, confusion, focal weakness, paresthesia,     Speech difficulties, memory loss, gait difficulty  Psychological: Feelings of anxiety, depression, agitation    Objective:   VITALS:    Visit Vitals  /65 (BP 1 Location: Left arm, BP Patient Position: At rest)   Pulse 72   Temp 98.6 °F (37 °C)   Resp 16   Ht 5' 11\" (1.803 m)   Wt 72.2 kg (159 lb 3.2 oz)   SpO2 96%   BMI 22.20 kg/m²       PHYSICAL EXAM:    General:    Alert, cooperative, no distress, appears stated age. HEENT: Atraumatic, anicteric sclerae, pink conjunctivae     No oral ulcers, mucosa moist, throat clear, dentition fair  Neck:  Supple, symmetrical,  thyroid: non tender  Lungs:   Clear to auscultation bilaterally. No Wheezing or Rhonchi. No rales. Chest wall:  No tenderness  No Accessory muscle use. Heart:   Regular  rhythm,  No  murmur  edema  Abdomen:   Soft, non-tender. Not distended. Bowel sounds normal  Extremities: No cyanosis. No clubbing,      Skin turgor normal, Capillary refill normal, Radial dial pulse 2+  Skin:     Redness   Psych:  Good insight. Not depressed. Not anxious or agitated. Neurologic: EOMs intact. No facial asymmetry. No aphasia or slurred speech. Symmetrical strength, Sensation grossly intact. Alert and oriented X 2 .     _______________________________________________________________________  Care Plan discussed with:    Comments   Patient y    Family      RN y    Care Manager                    Consultant:      _______________________________________________________________________  Expected  Disposition:   Home with Family y   HH/PT/OT/RN    SNF/LTC    KHURRAM    ________________________________________________________________________  TOTAL TIME:  61  Minutes    Critical Care Provided     Minutes non procedure based      Comments    y Reviewed previous records   >50% of visit spent in counseling and coordination of care y Discussion with patient and/or family and questions answered       ________________________________________________________________________  Signed:  Shaina Mojica MD    Procedures: see electronic medical records for all procedures/Xrays and details which were not copied into this note but were reviewed prior to creation of Plan. LAB DATA REVIEWED:    Recent Results (from the past 24 hour(s))   CBC WITH AUTOMATED DIFF    Collection Time: 02/19/20  5:46 PM   Result Value Ref Range    WBC 15.3 (H) 4.1 - 11.1 K/uL    RBC 4.05 (L) 4.10 - 5.70 M/uL    HGB 12.2 12.1 - 17.0 g/dL    HCT 36.0 (L) 36.6 - 50.3 %    MCV 88.9 80.0 - 99.0 FL    MCH 30.1 26.0 - 34.0 PG    MCHC 33.9 30.0 - 36.5 g/dL    RDW 14.4 11.5 - 14.5 %    PLATELET 753 157 - 838 K/uL    MPV 9.3 8.9 - 12.9 FL    NRBC 0.0 0  WBC    ABSOLUTE NRBC 0.00 0.00 - 0.01 K/uL    NEUTROPHILS 89 (H) 32 - 75 %    BAND NEUTROPHILS 2 %    LYMPHOCYTES 4 (L) 12 - 49 %    MONOCYTES 5 5 - 13 %    EOSINOPHILS 0 0 - 7 %    BASOPHILS 0 0 - 1 %    IMMATURE GRANULOCYTES 0 0.0 - 0.5 %    ABS. NEUTROPHILS 13.9 (H) 1.8 - 8.0 K/UL    ABS. LYMPHOCYTES 0.6 (L) 0.8 - 3.5 K/UL    ABS. MONOCYTES 0.8 0.0 - 1.0 K/UL    ABS. EOSINOPHILS 0.0 0.0 - 0.4 K/UL    ABS. BASOPHILS 0.0 0.0 - 0.1 K/UL    ABS. IMM. GRANS. 0.0 0.00 - 0.04 K/UL    DF MANUAL      RBC COMMENTS NORMOCYTIC, NORMOCHROMIC     METABOLIC PANEL, COMPREHENSIVE    Collection Time: 02/19/20  5:46 PM   Result Value Ref Range    Sodium 133 (L) 136 - 145 mmol/L    Potassium 3.8 3.5 - 5.1 mmol/L    Chloride 100 97 - 108 mmol/L    CO2 27 21 - 32 mmol/L    Anion gap 6 5 - 15 mmol/L    Glucose 157 (H) 65 - 100 mg/dL    BUN 38 (H) 6 - 20 MG/DL    Creatinine 1.24 0.70 - 1.30 MG/DL    BUN/Creatinine ratio 31 (H) 12 - 20      GFR est AA >60 >60 ml/min/1.73m2    GFR est non-AA 55 (L) >60 ml/min/1.73m2    Calcium 9.3 8.5 - 10.1 MG/DL    Bilirubin, total 0.5 0.2 - 1.0 MG/DL    ALT (SGPT) 53 12 - 78 U/L    AST (SGOT) 135 (H) 15 - 37 U/L    Alk.  phosphatase 95 45 - 117 U/L    Protein, total 7.8 6.4 - 8.2 g/dL    Albumin 3.5 3.5 - 5.0 g/dL    Globulin 4.3 (H) 2.0 - 4.0 g/dL    A-G Ratio 0.8 (L) 1.1 - 2.2     CK    Collection Time: 02/19/20  5:46 PM   Result Value Ref Range    CK 3,805 (H) 39 - 308 U/L   LIPASE    Collection Time: 02/19/20  5:46 PM   Result Value Ref Range    Lipase 157 73 - 393 U/L   TROPONIN I    Collection Time: 02/19/20  5:46 PM   Result Value Ref Range    Troponin-I, Qt. 6.50 (H) <0.05 ng/mL   EKG, 12 LEAD, INITIAL    Collection Time: 02/19/20  5:53 PM   Result Value Ref Range    Ventricular Rate 88 BPM    Atrial Rate 88 BPM    P-R Interval 280 ms    QRS Duration 120 ms    Q-T Interval 402 ms    QTC Calculation (Bezet) 486 ms    Calculated P Axis 75 degrees    Calculated R Axis -60 degrees    Calculated T Axis 54 degrees    Diagnosis       Sinus rhythm with 1st degree AV block  Possible Left atrial enlargement  Left anterior fascicular block  Left ventricular hypertrophy with QRS widening and repolarization abnormality  Anteroseptal infarct , age undetermined  When compared with ECG of 29-MAR-2000 09:58,  Previous ECG has undetermined rhythm, needs review  Left anterior fascicular block is now present  Anteroseptal infarct is now present  QT has lengthened     URINALYSIS W/ REFLEX CULTURE    Collection Time: 02/19/20  7:23 PM   Result Value Ref Range    Color YELLOW/STRAW      Appearance CLOUDY (A) CLEAR      Specific gravity 1.022 1.003 - 1.030      pH (UA) 5.5 5.0 - 8.0      Protein 300 (A) NEG mg/dL    Glucose 250 (A) NEG mg/dL    Ketone 15 (A) NEG mg/dL    Bilirubin NEGATIVE  NEG      Blood LARGE (A) NEG      Urobilinogen 0.2 0.2 - 1.0 EU/dL    Nitrites NEGATIVE  NEG      Leukocyte Esterase SMALL (A) NEG      WBC 0-4 /hpf    RBC 20-50 /hpf    Epithelial cells MODERATE /lpf    Bacteria NEGATIVE  /hpf    UA:UC IF INDICATED CULTURE NOT INDICATED BY UA RESULT      Amorphous Crystals 1+     Granular cast 0-2 /lpf   PTT    Collection Time: 02/19/20  9:51 PM   Result Value Ref Range    aPTT 24.9 22.1 - 32.0 sec    aPTT, therapeutic range     58.0 - 77.0 SECS   LACTIC ACID    Collection Time: 02/20/20 12:45 AM   Result Value Ref Range    Lactic acid 1.2 0.4 - 2.0 MMOL/L   TROPONIN I    Collection Time: 02/20/20 12:45 AM   Result Value Ref Range Troponin-I, Qt. 14.20 (H) <0.05 ng/mL

## 2020-02-20 NOTE — CONSULTS
Nephrology Consult Note     Refugio Marcelino     [facility]. [email or web address]              Phone - [phone]   Patient: Kristian Hernnadez YOB: 1929    Date- 2/20/2020  MRN: [id number]             REASON FOR CONSULTATION: Rhabdomyolysis  CONSULTING PHYSICIAN: Dr. Isma Vidal MD     ASSESSMENT & PLAN:      Rhabdomyolysis-due to fall and Zocor   Acute kidney injury likely due to dehydration and rhabdo   Dementia  · Elevated troponin-14.20  · Status post fall  · History of hypertension  · Hyponatremia due to dehydration  · Hyperlipidemia  PLAN-   Continue normal saline 100 cc/h   Check CK level in the morning   Check BMP in the morning    Hold lisinopril   Hold Zocor           · Active Problems:  ·   [facility]non[facility]ST [facility] (2/19/2020)  ·   ·     [x] High complexity decision making was performed  [x] Patient is at high-risk of decompensation with multiple organ involvement    Subjective:   HPI: Kristian Hernandez is a 80 y.o.  male. He said he is admitted with complaint of fall  In the emergency room his sodium level was 133, potassium 3.8, creatinine 1.24, CK level 3/8/2005   He is not able to give me any history due to dementia  History was received from patient's son. As per son patient was found down yesterday afternoon son thinks that patient may have fallen in the morning  Patient is on lisinopril and Zocor  There is no history of alcohol abuse  His [place] [address] today  His troponin level is increased to 14.2  His hip and knee x-rays negative for any fracture   review of Systems:   Can't access due to patient's current condition     Past Medical History:   Diagnosis Date    High blood pressure     High cholesterol     Prostate cancer ([facility]       History reviewed. No pertinent surgical history. Prior to Admission medications    Medication Sig Start Date End Date Taking? Authorizing Provider   LISINOPRIL PO Take  by mouth. Yes Other, MD Inez   SIMVASTATIN PO Take  by mouth. Yes Theo, MD Inez   magnesium 250 mg tab Take  by mouth as needed. Indications: leg cramps   Yes Provider, Historical     No Known Allergies   Social History     Tobacco Use    Smoking status: Not on file   Substance Use Topics    Alcohol use: Not on file      History reviewed. No pertinent family history. Objective:      Patient Vitals for the past 24 hrs:   Temp Pulse Resp BP SpO2   02/20/20 1200 98.2 °F (36.8 °C) 65 16 137/66 98 %   02/20/20 0908    143/67    02/20/20 0800 98.4 °F (36.9 °C) 80 16 143/67 96 %   02/20/20 0759  80   96 %   02/20/20 0430 98.6 °F (37 °C) 72 16 129/65 96 %   02/20/20 0152 98.6 °F (37 °C) 79 18 135/75 99 %   02/20/20 0100 99.3 °F (37.4 °C) 84 18 135/83 94 %   02/20/20 0058  83 18  95 %   02/20/20 0054  80 19  95 %   02/20/20 0030  90 20 140/67 95 %   02/20/20 0000  84 18 147/78 95 %   02/19/20 2330  85 18 144/80 94 %   02/19/20 2230    145/79 94 %   02/19/20 2200  89 17 144/69 95 %   02/19/20 2130  93 18 141/77 95 %   02/19/20 2100  90 20 151/81 95 %   02/19/20 1945  88 21 145/85 95 %   02/19/20 1930  94 18 147/86 96 %   02/19/20 1730  89 18  94 %   02/19/20 1716     96 %   02/19/20 1715    143/79    02/19/20 1708 98.1 °F (36.7 °C) 91 16 143/79 94 %     No intake/output data recorded.   Physical Exam:  General:Alert, No distress,   Eyes:No scleral icterus, No conjunctival pallor  Neck:Supple,no mass palpable,no thyromegaly  Lungs:Clears to auscultation Bilaterally, normal respiratory effort  CVS:RRR, S1 S2 normal,  No rub,  Abdomen:Soft, Non tender, No hepatosplenomegaly  Extremities: + LE edema  Skin:No rash or lesions, Warm and DRY   Psych:Can't access due to patient's current condition     :  No balbuena  Musculoskeletal : no redness, no joint tenderness  NEURO: Non focal       CODE STATUS:  full  Care Plan discussed with:  Son,      Chart reviewed. ECG[de-identified] Rev:yes  Xray/CT/US/MRI REV:yes  Lab Data Personally Reviewed: (see below)  Recent Labs     02/20/20  0431 02/19/20  2151 02/19/20  1746   WBC 12.1*  --  15.3*   HGB 11.6*  --  12.2     --  344   ANEU 9.9*  --  13.9*   APTT 38.1* 24.9  --      --  133*   K 3.4*  --  3.8   *  --  157*   BUN 32*  --  38*   CREA 0.90  --  1.24   ALT 80*  --  53   SGOT 423*  --  135*   TBILI 0.3  --  0.5   AP 71  --  95   CA 8.2*  --  9.3   PHOS 3.1  --   --      Lab Results   Component Value Date/Time    Color YELLOW/STRAW 02/19/2020 07:23 PM    Appearance CLOUDY (A) 02/19/2020 07:23 PM    Specific gravity 1.022 02/19/2020 07:23 PM    pH (UA) 5.5 02/19/2020 07:23 PM    Protein 300 (A) 02/19/2020 07:23 PM    Glucose 250 (A) 02/19/2020 07:23 PM    Ketone 15 (A) 02/19/2020 07:23 PM    Bilirubin NEGATIVE  02/19/2020 07:23 PM    Urobilinogen 0.2 02/19/2020 07:23 PM    Nitrites NEGATIVE  02/19/2020 07:23 PM    Leukocyte Esterase SMALL (A) 02/19/2020 07:23 PM    Epithelial cells MODERATE 02/19/2020 07:23 PM    Bacteria NEGATIVE  02/19/2020 07:23 PM    WBC 0-4 02/19/2020 07:23 PM    RBC 20-50 02/19/2020 07:23 PM       No results found for: IRON, FE, TIBC, IBCT, PSAT, FERR  Lab Results   Component Value Date/Time    Culture result: NO GROWTH AFTER 6 HOURS 02/20/2020 12:45 AM     Prior to Admission Medications   Prescriptions Last Dose Informant Patient Reported? Taking? LISINOPRIL PO   Yes Yes   Sig: Take  by mouth. SIMVASTATIN PO   Yes Yes   Sig: Take  by mouth.   magnesium 250 mg tab   Yes Yes   Sig: Take  by mouth as needed. Indications: leg cramps      Facility-Administered Medications: None     Imaging:    Medications list Personally Reviewed   [x]      Yes     []               No    Thank you for allowing us to participate in the care this patient. We will follow patient with you.   Signed By: Juliana Flaherty MD  [facility]  [id number]  [address] [facility] [address] [facility], [address]  [place], [address] [facility]  Phone - [phone]         Fax - [phone] [facility] Office  [address] [place] [address]  Phone - [phone]        Fax - [phone]     [email or web address]

## 2020-02-20 NOTE — ED NOTES
TRANSFER - OUT REPORT:    Verbal report given to Braydon Skaggs RN(name) on Micaela Estrada.  being transferred to IVCU(unit) for routine progression of care       Report consisted of patients Situation, Background, Assessment and   Recommendations(SBAR). Information from the following report(s) SBAR, Kardex, ED Summary, Intake/Output and Recent Results was reviewed with the receiving nurse. Lines:       Opportunity for questions and clarification was provided.       Patient transported with:   Monitor  Registered Nurse

## 2020-02-20 NOTE — CONSULTS
[address] [place] [address]  [phone]        Date of  Admission: 2/19/2020  5:05 PM     Admission type:Emergency    Consult for: NSTEMI (non-ST elevated myocardial infarction) [facility]) [I21.4]  Consult by: Dr Melissa Abrams NP     Subjective:     Anton Llanes is a 80 y.o. male admitted for NSTEMI (non-ST elevated myocardial infarction) ([facility] [I21.4]. Patient complains of  none. He has past medical history of dementia presented to ED yesterday  after a fall. The patient does not remember falling, but was found by 1 of his caregivers on the bathroom floor. He cannot recall falling but states he didn't hurt himself. No cardiac complaints. Requesting to leave. Previous treatment/evaluation includes none . Cardiac risk factors: sedentary life style, male gender. Patient Active Problem List    Diagnosis Date Noted    [facility]non[facility]ST [facility] 02/19/2020      Evangelina Leach MD  Past Medical History:   Diagnosis Date    High blood pressure     High cholesterol     Prostate cancer [facility])       Social History     Patient does not qualify to have social determinant information on file (likely too young). Socioeconomic History    Marital status:      Spouse name: Not on file    Number of children: Not on file    Years of education: Not on file    Highest education level: Not on file     No Known Allergies   History reviewed. No pertinent family history.    Current Facility-Administered Medications   Medication Dose Route Frequency    sodium chloride (NS) flush 5-40 mL  5-40 mL IntraVENous Q8H    sodium chloride (NS) flush 5-40 mL  5-40 mL IntraVENous PRN    acetaminophen (TYLENOL) tablet 650 mg  650 mg Oral Q6H PRN    ondansetron (ZOFRAN) injection 4 mg  4 mg IntraVENous Q6H PRN    bisacodyL (DULCOLAX) tablet 5 mg  5 mg Oral DAILY PRN    potassium chloride 10 mEq in 100 ml IVPB  10 mEq IntraVENous ONCE    heparin 25,000 units in D5W 250 ml infusion  12-25 Units/kg/hr IntraVENous TITRATE    heparin (porcine) injection 2,000 Units  2,000 Units IntraVENous PRN    Or    heparin (porcine) injection 4,000 Units  4,000 Units IntraVENous PRN    cefTRIAXone (ROCEPHIN) 2 g in 0.9% sodium chloride (MBP/ADV) 50 mL  2 g IntraVENous Q24H    azithromycin (ZITHROMAX) 500 mg in 0.9% sodium chloride (MBP/ADV) 250 mL  500 mg IntraVENous Q24H         Review of Symptoms:  Constitutional: negative  Eyes: negative  Ears, nose, mouth, throat, and face: negative  Respiratory: negative  Cardiovascular: negative  Gastrointestinal: negative  Genitourinary:negative  Musculoskeletal:negative  Neurological: negative  Endocrine: negative     Subjective:      Visit Vitals  /67 (BP 1 Location: Left arm, BP Patient Position: At rest)   Pulse 80   Temp 98.4 °F (36.9 °C)   Resp 16   Ht 5' 11\" (1.803 m)   Wt 159 lb 3.2 oz (72.2 kg)   SpO2 96%   BMI 22.20 kg/m²       Physical:    General: WD, WN. Alert, cooperative, no acute distress  Heart: Regular, S1 WNL and S2 WNL.  No S3 or S4, no m/S3/JVD, no carotid bruits   Lungs: clear   Abdomen: Soft, +BS, NTND   Extremities: LE todd +DP/PT, right lower leg wrapped  Neurologic: responds, alert and oriented    Data Review:   Recent Labs     02/20/20  0431 02/19/20  1746   WBC 12.1* 15.3*   HGB 11.6* 12.2   HCT 34.1* 36.0*    344     Recent Labs     02/20/20  0431 02/19/20  1746    133*   K 3.4* 3.8    100   CO2 25 27   * 157*   BUN 32* 38*   CREA 0.90 1.24   CA 8.2* 9.3   ALB 2.6* 3.5   TBILI 0.3 0.5   SGOT 423* 135*   ALT 80* 53       Recent Labs     02/20/20  0045 02/19/20  1746   TROIQ 14.20* 6.50*   CPK  --  3,805*         Intake/Output Summary (Last 24 hours) at 2/20/2020 0840  Last data filed at 2/20/2020 3278  Gross per 24 hour   Intake 1417.95 ml   Output    Net 1417.95 ml        Cardiographics    Telemetry: SR 1st degree AV block  ECG: SR 1st degree av block, rate 80  Echocardiogram: pending. Assessment:            Active Problems:    NSTEMI (non-ST elevated myocardial infarction) ([facility] (2/19/2020)         Plan:     Micaela Estrada. is a pleasant 80year old with hx of dementia, fall and elevated trop with elevated CK and pneumonia. WBC elevated. Will get echo. Ok to resume diet. Ct negative - on heparin. Will follow. Thank you for this interesting consultation. Giuliana Ordoñez ANP    Patient seen and examined by me with nurse practitioner. I personally performed all components of the history, physical, and medical decision making and agree with the assessment and plan as noted. Baseline status not known. Will d/w family prior to any further intervention. Add aspirin. No statin due to elevated LFTs.       Bhaskar Titus MD

## 2020-02-20 NOTE — WOUND CARE
Wound Care Consult: Chart reviewed and patient assessed for his right leg wounds that were present on admission. Patient was found down in his bathroom after most likely being on the floor all day with the Right leg under his body. Ruling out NSTEMI vs. Rhabdomyolysis. CK has increased x 4 and the Troponin is also elevated. Pt. Has no appetite but he does c/o thirst.  
Assessment: The right leg is much more edematous than the left. His pulse in the DP is strong and the post. Tibial is also palpable. There is a large area of popped blister with serous fluid (yellow) drainage on the medial side of the right leg. The lateral side of the leg has intact blisters. See photos below. Patient is able to move the right foot and toes grossly but today at the time of this exam the patient cannot feel the great toe or the second toe.  
2-: Right leg (intact blisters):  
 
 
2-: Right leg popped blisters (skin tears): Treatment today and orders written: The leg wounds were cleansed with Carraklenz spray and wiped with gauze to remove the old drainage. Xeroform gauze was applied to the broken skin and the intact blister skin and then covered with a secondary ABD dressing and wrapped with Jeff Pares. Heels floated to protect the heels and the right leg is on a second pillow to elevate slightly higher. Pulses still palpable when elevated. Plan: Orders written for the above wound care and discussed with nursing.   
Ken Alvarado RN, BSN, [employer]

## 2020-02-20 NOTE — PROGRESS NOTES
RAJAN: SNF vs Home with HH vs Home with family assistance    Family to transport as needed   1st [facility] Medicare letter given on 2/19/2020      Reason for Admission:   NSTEMI, Pneumonia (currently on IV antibiotics)                  RUR Score:    14% low -  Patient is now a moderate risk for readmission due to residing alone with dementia diagnosis              Do you (patient/family) have any concerns for transition/discharge? No current concerns identified. Pt may need PT/OT evaluation to determine level of care at discharge. Plan for utilizing home health:   Possible need for [facility] services if patient returns home alone. Current Advanced Directive/Advance Care Plan:  No plan on file, daughter reports that she does not know if one was completed            Transition of Care Plan:      SNF vs Home with [facility] vs Home with family assistance    Pt is a 80year old male admitted to [address] [place] [address] with NSTEMI and Pneumonia. Pt admitted inpatient on IVCU. Pt has diagnosis of Dementia and family in room to answer questions for assessment. Pt's daughterThalia Expose [phone], son Mamie  [phone] and his wife present in room. Family reports that patient lives in the home alone, however, daughter and son both check on patient often and they have recently hired a private caregiver to come in daily to make meals and do light house chores. Patient resides in a 2 level home but resides only on the 2nd floor. Thre are 4 steps to enter home, no ramps. Pt able to bath and ambulate without assistance. He does use a cane when he leaves the home. He only has a cane for DME in the home. Daughter denies SNF or IPR level rehab. States that patient has had a [employer] come to he home in the past, unable to remember the name of the agency. Pt's PCP is Dr. Alonso Pathak MD.  Last saw Dr. David Daily in the early Fall of 2019 to obtain a Flu shot. He also has a urologist that he sees at [facility].   Daughter reports that patient has not been taking his medications regularly. There are no issues with affording the medications and he uses [facility] [address] for pharmacy needs. Daughter verifies that patient has [place]. CM will follow to determine if patient has any rehab needs and work with family on a safe discharge. Follow up appointments will be made as needed. Care Management Interventions  PCP Verified by CM: Yes(last seen Fall of 2019 for Flu shot)  Mode of Transport at Discharge: Other (see comment)  Physical Therapy Consult: No  Occupational Therapy Consult: No  Current Support Network: Lives Alone, Own Home  Confirm Follow Up Transport: Family    Jesus Gallardo \[facility]\"", [facility] [place] - ED [facility]  [employer] ACP Facilitator  Zone Phone: [phone]      Mobile: [phone]

## 2020-02-20 NOTE — PROGRESS NOTES
TRANSFER - IN REPORT:  0103    Verbal report received from [facility] on Bandar Amato.  being received from ER(unit) for routine progression of care      Report consisted of patients Situation, Background, Assessment and   Recommendations(SBAR). Information from the following report(s) SBAR, ED Summary, Intake/Output, Med Rec Status and Cardiac Rhythm SR 1st degree block was reviewed with the receiving nurse. Opportunity for questions and clarification was provided. Yasmin Kenney will verify Med Rec and PMH with family prior to transfer. Assessment will be completed upon patients arrival to unit and care assumed. 0145 - Received to 2156 via stretcher. Dtr with pt, she will be securing medication doses and relaying to us in am.  Alert, somewhat forgetful, baseline per daughter. Bed alarms on. VSS. RA.  SR 1st degree AVB. R lower leg with several blisters, several which have popped and sloughed. caraklenz spray , nonadherant dressing covered w abd and talya. Lower back abraision / scratch, several bruises on elbows and arms and few on hips. Pt w no chest pain, dyspnea or current distress other than his R lower leg is noteably sensitive. Lungs dim. Heparin qtt and antibiotic infusing on arrival as ordered. 0210 - Tylenol for R leg pain. Repositioned w several pillows. 0630 - tylenol per request.  Heparin drip rate adjusted per protocol.    0700 - Bedside report to RN.  AVB 1st degree.

## 2020-02-20 NOTE — PROGRESS NOTES
Hospitalist Progress Note    NAME: Jaye Esparza. :  5/10/1929   MRN:  [id number]       Assessment / Plan:  NSTEMI: As per Cardiology medical management, c/w ASA, BB, avoid statin for now due to Rhabdomyolysis. Cardiology in the case. Acute Rhabdomyolysis: c/w IVF, add low dose Bicarbonate, get Nephrology evaluation. RLL Pneumonia: c/w CTX/Z-max, bronchodilators, mucolytics, check sputum. HTN : low dose BB, monitor  Hypercholesteremia  D/c Statin    Hypokalemia: replace and monitor  S/p Fall: multiple bruises in legs, get wound care. Surrogate Decision Maker:Priyanka Veronica  [phone] [id number]  Code status: Full  Prophylaxis: Hep SQ  Recommended Disposition: SNF/LTC     Subjective:     Chief Complaint / Reason for Physician Visit  \"I'm hurt all over\". Discussed with RN events overnight. Review of Systems:  Symptom Y/N Comments  Symptom Y/N Comments   Fever/Chills    Chest Pain     Poor Appetite    Edema     Cough y   Abdominal Pain     Sputum    Joint Pain y    SOB/PRADHAN y   Pruritis/Rash     Nausea/vomit    Tolerating PT/OT     Diarrhea    Tolerating Diet y    Constipation    Other       Could NOT obtain due to:      Objective:     VITALS:   Last 24hrs VS reviewed since prior progress note.  Most recent are:  Patient Vitals for the past 24 hrs:   Temp Pulse Resp BP SpO2   20 0908    143/67    20 0800 98.4 °F (36.9 °C) 80 16 143/67 96 %   20 0759  80   96 %   20 0430 98.6 °F (37 °C) 72 16 129/65 96 %   20 0152 98.6 °F (37 °C) 79 18 135/75 99 %   20 0100 99.3 °F (37.4 °C) 84 18 135/83 94 %   20 0058  83 18  95 %   20 0054  80 19  95 %   20 0030  90 20 140/67 95 %   20 0000  84 18 147/78 95 %   20 2330  85 18 144/80 94 %   20 2230    145/79 94 %   20 2200  89 17 144/69 95 %   02/19/20 2130  93 18 141/77 95 %   20 2100  90 20 151/81 95 %   20 1945  88 21 145/85 95 %   20 1930  94 18 147/86 96 % 02/19/20 1730  89 18  94 %   02/19/20 1716     96 %   02/19/20 1715    143/79    02/19/20 1708 98.1 °F (36.7 °C) 91 16 143/79 94 %       Intake/Output Summary (Last 24 hours) at 2/20/2020 1101  Last data filed at 2/20/2020 0624  Gross per 24 hour   Intake 1417.95 ml   Output    Net 1417.95 ml        PHYSICAL EXAM:  General: WD, WN. Alert, cooperative, no acute distress    EENT:  EOMI. Anicteric sclerae. MMM  Resp:  Coarse BS, rhonchi  CV:  Regular  rhythm,  No edema  GI:  Soft, Non distended, Non tender.  +Bowel sounds  Neurologic:  Alert and oriented X 2, normal speech,   Psych:   Fair  insight. Not anxious nor agitated  Skin:  No rashes. No jaundice    Reviewed most current lab test results and cultures  YES  Reviewed most current radiology test results   YES  Review and summation of old records today    NO  Reviewed patient's current orders and MAR    YES  PMH/SH reviewed - no change compared to H&P  ________________________________________________________________________  Care Plan discussed with:    Comments   Patient y    Family  y Priyanka ACOSTA y    Care Manager     Consultant                        Multidiciplinary team rounds were held today with , nursing, pharmacist and clinical coordinator. Patient's plan of care was discussed; medications were reviewed and discharge planning was addressed. ________________________________________________________________________  Total NON critical care TIME: 35 Minutes    Total CRITICAL CARE TIME Spent:   Minutes non procedure based      Comments   >50% of visit spent in counseling and coordination of care y    ________________________________________________________________________  Hung Sun MD     Procedures: see electronic medical records for all procedures/Xrays and details which were not copied into this note but were reviewed prior to creation of Plan.       LABS:  I reviewed today's most current labs and imaging studies.   Pertinent labs include:  Recent Labs     02/20/20  0431 02/19/20  1746   WBC 12.1* 15.3*   HGB 11.6* 12.2   HCT 34.1* 36.0*    344     Recent Labs     02/20/20  0431 02/19/20  1746    133*   K 3.4* 3.8    100   CO2 25 27   * 157*   BUN 32* 38*   CREA 0.90 1.24   CA 8.2* 9.3   ALB 2.6* 3.5   TBILI 0.3 0.5   SGOT 423* 135*   ALT 80* 53       Signed: Vladimir Marley MD

## 2020-02-21 ENCOUNTER — APPOINTMENT (OUTPATIENT)
Dept: CT IMAGING | Age: 85
DRG: 280 | End: 2020-02-21
Attending: INTERNAL MEDICINE
Payer: MEDICARE

## 2020-02-21 ENCOUNTER — APPOINTMENT (OUTPATIENT)
Dept: GENERAL RADIOLOGY | Age: 85
DRG: 280 | End: 2020-02-21
Attending: INTERNAL MEDICINE
Payer: MEDICARE

## 2020-02-21 LAB
ALBUMIN SERPL-MCNC: 2.5 G/DL (ref 3.5–5)
ALBUMIN/GLOB SERPL: 0.7 {RATIO} (ref 1.1–2.2)
ALP SERPL-CCNC: 64 U/L (ref 45–117)
ALT SERPL-CCNC: 87 U/L (ref 12–78)
ANION GAP SERPL CALC-SCNC: 6 MMOL/L (ref 5–15)
APTT PPP: 93.7 SEC (ref 22.1–32)
AST SERPL-CCNC: 383 U/L (ref 15–37)
BASOPHILS # BLD: 0.1 K/UL (ref 0–0.1)
BASOPHILS NFR BLD: 0 % (ref 0–1)
BILIRUB SERPL-MCNC: 0.4 MG/DL (ref 0.2–1)
BUN SERPL-MCNC: 30 MG/DL (ref 6–20)
BUN/CREAT SERPL: 32 (ref 12–20)
CALCIUM SERPL-MCNC: 8 MG/DL (ref 8.5–10.1)
CHLORIDE SERPL-SCNC: 104 MMOL/L (ref 97–108)
CK SERPL-CCNC: 7417 U/L (ref 39–308)
CO2 SERPL-SCNC: 27 MMOL/L (ref 21–32)
CREAT SERPL-MCNC: 0.94 MG/DL (ref 0.7–1.3)
DIFFERENTIAL METHOD BLD: ABNORMAL
EOSINOPHIL # BLD: 0 K/UL (ref 0–0.4)
EOSINOPHIL NFR BLD: 0 % (ref 0–7)
ERYTHROCYTE [DISTWIDTH] IN BLOOD BY AUTOMATED COUNT: 14.8 % (ref 11.5–14.5)
GLOBULIN SER CALC-MCNC: 3.5 G/DL (ref 2–4)
GLUCOSE SERPL-MCNC: 114 MG/DL (ref 65–100)
HCT VFR BLD AUTO: 33.1 % (ref 36.6–50.3)
HGB BLD-MCNC: 11.2 G/DL (ref 12.1–17)
IMM GRANULOCYTES # BLD AUTO: 0.1 K/UL (ref 0–0.04)
IMM GRANULOCYTES NFR BLD AUTO: 1 % (ref 0–0.5)
LYMPHOCYTES # BLD: 1 K/UL (ref 0.8–3.5)
LYMPHOCYTES NFR BLD: 8 % (ref 12–49)
MAGNESIUM SERPL-MCNC: 2.5 MG/DL (ref 1.6–2.4)
MCH RBC QN AUTO: 30.4 PG (ref 26–34)
MCHC RBC AUTO-ENTMCNC: 33.8 G/DL (ref 30–36.5)
MCV RBC AUTO: 89.9 FL (ref 80–99)
MONOCYTES # BLD: 1.3 K/UL (ref 0–1)
MONOCYTES NFR BLD: 10 % (ref 5–13)
NEUTS SEG # BLD: 10.4 K/UL (ref 1.8–8)
NEUTS SEG NFR BLD: 81 % (ref 32–75)
NRBC # BLD: 0 K/UL (ref 0–0.01)
NRBC BLD-RTO: 0 PER 100 WBC
PLATELET # BLD AUTO: 296 K/UL (ref 150–400)
PMV BLD AUTO: 9.7 FL (ref 8.9–12.9)
POTASSIUM SERPL-SCNC: 3.4 MMOL/L (ref 3.5–5.1)
PROT SERPL-MCNC: 6 G/DL (ref 6.4–8.2)
RBC # BLD AUTO: 3.68 M/UL (ref 4.1–5.7)
SODIUM SERPL-SCNC: 137 MMOL/L (ref 136–145)
THERAPEUTIC RANGE,PTTT: ABNORMAL SECS (ref 58–77)
WBC # BLD AUTO: 12.9 K/UL (ref 4.1–11.1)

## 2020-02-21 PROCEDURE — 74011250636 HC RX REV CODE- 250/636: Performed by: EMERGENCY MEDICINE

## 2020-02-21 PROCEDURE — 77030040831 HC BAG URINE DRNG MDII -A

## 2020-02-21 PROCEDURE — 74011250637 HC RX REV CODE- 250/637: Performed by: NURSE PRACTITIONER

## 2020-02-21 PROCEDURE — 73120 X-RAY EXAM OF HAND: CPT

## 2020-02-21 PROCEDURE — 80053 COMPREHEN METABOLIC PANEL: CPT

## 2020-02-21 PROCEDURE — 74011250637 HC RX REV CODE- 250/637: Performed by: INTERNAL MEDICINE

## 2020-02-21 PROCEDURE — 83735 ASSAY OF MAGNESIUM: CPT

## 2020-02-21 PROCEDURE — 70450 CT HEAD/BRAIN W/O DYE: CPT

## 2020-02-21 PROCEDURE — 85730 THROMBOPLASTIN TIME PARTIAL: CPT

## 2020-02-21 PROCEDURE — 74011250636 HC RX REV CODE- 250/636: Performed by: INTERNAL MEDICINE

## 2020-02-21 PROCEDURE — 36415 COLL VENOUS BLD VENIPUNCTURE: CPT

## 2020-02-21 PROCEDURE — 85025 COMPLETE CBC W/AUTO DIFF WBC: CPT

## 2020-02-21 PROCEDURE — 74011000258 HC RX REV CODE- 258: Performed by: EMERGENCY MEDICINE

## 2020-02-21 PROCEDURE — 65660000000 HC RM CCU STEPDOWN

## 2020-02-21 PROCEDURE — 77030040392 HC DRSG OPTIFOAM MDII -A

## 2020-02-21 PROCEDURE — 82550 ASSAY OF CK (CPK): CPT

## 2020-02-21 RX ORDER — METOPROLOL TARTRATE 25 MG/1
25 TABLET, FILM COATED ORAL EVERY 12 HOURS
Status: DISCONTINUED | OUTPATIENT
Start: 2020-02-21 | End: 2020-02-25 | Stop reason: HOSPADM

## 2020-02-21 RX ORDER — AMLODIPINE BESYLATE 5 MG/1
2.5 TABLET ORAL DAILY
Status: DISCONTINUED | OUTPATIENT
Start: 2020-02-21 | End: 2020-02-25

## 2020-02-21 RX ORDER — SODIUM BICARBONATE 650 MG/1
325 TABLET ORAL 2 TIMES DAILY
Status: COMPLETED | OUTPATIENT
Start: 2020-02-21 | End: 2020-02-21

## 2020-02-21 RX ORDER — ENOXAPARIN SODIUM 100 MG/ML
40 INJECTION SUBCUTANEOUS EVERY 24 HOURS
Status: DISCONTINUED | OUTPATIENT
Start: 2020-02-21 | End: 2020-02-25 | Stop reason: HOSPADM

## 2020-02-21 RX ORDER — POTASSIUM CHLORIDE 750 MG/1
40 TABLET, FILM COATED, EXTENDED RELEASE ORAL
Status: COMPLETED | OUTPATIENT
Start: 2020-02-21 | End: 2020-02-21

## 2020-02-21 RX ORDER — ENOXAPARIN SODIUM 100 MG/ML
1 INJECTION SUBCUTANEOUS EVERY 12 HOURS
Status: DISCONTINUED | OUTPATIENT
Start: 2020-02-21 | End: 2020-02-21

## 2020-02-21 RX ORDER — AMLODIPINE BESYLATE 2.5 MG/1
2.5 TABLET ORAL DAILY
Status: DISCONTINUED | OUTPATIENT
Start: 2020-02-22 | End: 2020-02-21

## 2020-02-21 RX ORDER — CLOPIDOGREL BISULFATE 75 MG/1
75 TABLET ORAL DAILY
Status: DISCONTINUED | OUTPATIENT
Start: 2020-02-21 | End: 2020-02-25 | Stop reason: HOSPADM

## 2020-02-21 RX ADMIN — Medication 10 ML: at 22:00

## 2020-02-21 RX ADMIN — ACETAMINOPHEN 650 MG: 325 TABLET ORAL at 12:35

## 2020-02-21 RX ADMIN — SODIUM BICARBONATE 325 MG: 650 TABLET ORAL at 09:35

## 2020-02-21 RX ADMIN — CLOPIDOGREL BISULFATE 75 MG: 75 TABLET ORAL at 12:34

## 2020-02-21 RX ADMIN — GUAIFENESIN 400 MG: 200 SOLUTION ORAL at 09:35

## 2020-02-21 RX ADMIN — POTASSIUM CHLORIDE 40 MEQ: 750 TABLET, FILM COATED, EXTENDED RELEASE ORAL at 12:34

## 2020-02-21 RX ADMIN — POTASSIUM CHLORIDE 40 MEQ: 750 TABLET, FILM COATED, EXTENDED RELEASE ORAL at 09:35

## 2020-02-21 RX ADMIN — Medication 10 ML: at 09:36

## 2020-02-21 RX ADMIN — HEPARIN SODIUM 12 UNITS/KG/HR: 10000 INJECTION, SOLUTION INTRAVENOUS at 07:51

## 2020-02-21 RX ADMIN — ASPIRIN 81 MG: 81 TABLET ORAL at 09:35

## 2020-02-21 RX ADMIN — ENOXAPARIN SODIUM 40 MG: 40 INJECTION SUBCUTANEOUS at 12:34

## 2020-02-21 RX ADMIN — AMLODIPINE BESYLATE 2.5 MG: 5 TABLET ORAL at 17:04

## 2020-02-21 RX ADMIN — ACETAMINOPHEN 650 MG: 325 TABLET ORAL at 06:36

## 2020-02-21 RX ADMIN — Medication 10 ML: at 05:48

## 2020-02-21 RX ADMIN — SODIUM BICARBONATE 325 MG: 650 TABLET ORAL at 17:04

## 2020-02-21 RX ADMIN — GUAIFENESIN 400 MG: 200 SOLUTION ORAL at 17:04

## 2020-02-21 RX ADMIN — METOPROLOL TARTRATE 12.5 MG: 25 TABLET ORAL at 09:35

## 2020-02-21 RX ADMIN — CEFTRIAXONE SODIUM 2 G: 2 INJECTION, POWDER, FOR SOLUTION INTRAMUSCULAR; INTRAVENOUS at 23:25

## 2020-02-21 RX ADMIN — METOPROLOL TARTRATE 25 MG: 25 TABLET ORAL at 21:00

## 2020-02-21 NOTE — PROGRESS NOTES
Spiritual Care Partner Volunteer visited patient in [place] on February 21, 2020.     Documented by:     ANTIONE Null, [facility], Staff [address] [place] [address] [facility] Paging Service  [phone])

## 2020-02-21 NOTE — PROGRESS NOTES
1900 - Bedside report from Giuliana ACOSTA. Confused, restless and at times aggitated w nursing care. Turned and repositioned, urinary dribbling is constant, constant soft stooling per report. Hx of incontinence per family. Takes fluids as offered. IV's protected from pt pulling at lines. 2-3+ RLE edema. R hand / thumb area appears much larger than L in comparison. Dressing to R lower leg CDI.

## 2020-02-21 NOTE — PROGRESS NOTES
TRANSFER - IN REPORT:    Verbal report received from Fran Finney) on Bayron Louis.  being received from IVCU(unit) for routine progression of care      Report consisted of patients Situation, Background, Assessment and   Recommendations(SBAR). Information from the following report(s) SBAR, Kardex and MAR was reviewed with the receiving nurse. Opportunity for questions and clarification was provided. Assessment completed upon patients arrival to unit and care assumed. Primary Nurse Crispin Jett RN and [facility], RN performed a dual skin assessment on this patient Impairment noted- see wound doc flow sheet  Artem score is 13. Condom cath placed. incontinence care complete. Right leg wrapped. Dressing intact. Foam dressing applied to sacrum and lower back noted friction tear t lower back cover with foam dressing. 18- Patient family concerns that patient more confused than normal. Also right hand/wrist more swollen than this morning. Hand is elevated on pillow. Dr. Wyatt Barbour aware. New orders noted. Patient currently being feed by family tolerating well. 1839- Patient down with transport and RN for scans    1900-Bedside shift change report given to [address] [place] [address] (oncoming nurse) by Shawn Thomas RN (offgoing nurse). Report included the following information SBAR, Kardex, Intake/Output, MAR and Recent Results.

## 2020-02-21 NOTE — PROGRESS NOTES
2/21/2020 2:17 PM    Admit Date: 2/19/2020    Admit Diagnosis: NSTEM[facility] (non-ST elevated [facility] [I21.4]    Subjective:     Roxy Conrad.   denies chest pain, chest pressure/discomfort, dyspnea, palpitations, irregular heart beats, near-syncope, syncope, fatigue, orthopnea, paroxysmal nocturnal dyspnea.     Visit Vitals  /69 (BP 1 Location: Left arm, BP Patient Position: At rest)   Pulse 84   Temp 98.1 °F (36.7 °C)   Resp 18   Ht 5' 11\" (1.803 m)   Wt 163 lb 12.8 oz (74.3 kg) Comment: 2 pillows   SpO2 96%   BMI 22.85 kg/m²     Current Facility-Administered Medications   Medication Dose Route Frequency    sodium bicarbonate tablet 325 mg  325 mg Oral BID    [START ON 2/22/2020] amLODIPine (NORVASC) tablet 2.5 mg  2.5 mg Oral DAILY    clopidogreL (PLAVIX) tablet 75 mg  75 mg Oral DAILY    enoxaparin (LOVENOX) injection 40 mg  40 mg SubCUTAneous Q24H    metoprolol tartrate (LOPRESSOR) tablet 25 mg  25 mg Oral Q12H    sodium chloride (NS) flush 5-40 mL  5-40 mL IntraVENous Q8H    sodium chloride (NS) flush 5-40 mL  5-40 mL IntraVENous PRN    acetaminophen (TYLENOL) tablet 650 mg  650 mg Oral Q6H PRN    ondansetron (ZOFRAN) injection 4 mg  4 mg IntraVENous Q6H PRN    bisacodyL (DULCOLAX) tablet 5 mg  5 mg Oral DAILY PRN    aspirin delayed-release tablet 81 mg  81 mg Oral DAILY    0.45% sodium chloride infusion  75 mL/hr IntraVENous CONTINUOUS    albuterol-ipratropium (DUO-NEB) 2.5 MG-0.5 MG/3 ML  3 mL Nebulization Q6H PRN    guaiFENesin (ROBITUSSIN) 100 mg/5 mL oral liquid 400 mg  400 mg Oral TID    heparin (porcine) injection 2,000 Units  2,000 Units IntraVENous PRN    Or    heparin (porcine) injection 4,000 Units  4,000 Units IntraVENous PRN    cefTRIAXone (ROCEPHIN) 2 g in 0.9% sodium chloride (MBP/ADV) 50 mL  2 g IntraVENous Q24H    azithromycin (ZITHROMAX) 500 mg in 0.9% sodium chloride (MBP/ADV) 250 mL  500 mg IntraVENous Q24H         Objective: Visit Vitals  /69 (BP 1 Location: Left arm, BP Patient Position: At rest)   Pulse 84   Temp 98.1 °F (36.7 °C)   Resp 18   Ht 5' 11\" (1.803 m)   Wt 163 lb 12.8 oz (74.3 kg)   SpO2 96%   BMI 22.85 kg/m²       Physical Exam:  Abdomen: soft, non-tender. Bowel sounds normal.   Extremities: no cyanosis or edema  Heart: regular rate and rhythm, S1, S2 normal, no murmur, click, rub or gallop  Lungs: clear to auscultation bilaterally    Data Review:   Labs:    Recent Results (from the past 24 hour(s))   PTT    Collection Time: 02/20/20 10:02 PM   Result Value Ref Range    aPTT 49.4 (H) 22.1 - 32.0 sec    aPTT, therapeutic range     58.0 - 77.0 SECS   PTT    Collection Time: 02/21/20  5:41 AM   Result Value Ref Range    aPTT 93.7 (HH) 22.1 - 32.0 sec    aPTT, therapeutic range     58.0 - 77.0 SECS   CBC WITH AUTOMATED DIFF    Collection Time: 02/21/20  5:41 AM   Result Value Ref Range    WBC 12.9 (H) 4.1 - 11.1 K/uL    RBC 3.68 (L) 4.10 - 5.70 M/uL    HGB 11.2 (L) 12.1 - 17.0 g/dL    HCT 33.1 (L) 36.6 - 50.3 %    MCV 89.9 80.0 - 99.0 FL    MCH 30.4 26.0 - 34.0 PG    MCHC 33.8 30.0 - 36.5 g/dL    RDW 14.8 (H) 11.5 - 14.5 %    PLATELET 754 314 - 509 K/uL    MPV 9.7 8.9 - 12.9 FL    NRBC 0.0 0  WBC    ABSOLUTE NRBC 0.00 0.00 - 0.01 K/uL    NEUTROPHILS 81 (H) 32 - 75 %    LYMPHOCYTES 8 (L) 12 - 49 %    MONOCYTES 10 5 - 13 %    EOSINOPHILS 0 0 - 7 %    BASOPHILS 0 0 - 1 %    IMMATURE GRANULOCYTES 1 (H) 0.0 - 0.5 %    ABS. NEUTROPHILS 10.4 (H) 1.8 - 8.0 K/UL    ABS. LYMPHOCYTES 1.0 0.8 - 3.5 K/UL    ABS. MONOCYTES 1.3 (H) 0.0 - 1.0 K/UL    ABS. EOSINOPHILS 0.0 0.0 - 0.4 K/UL    ABS. BASOPHILS 0.1 0.0 - 0.1 K/UL    ABS. IMM.  GRANS. 0.1 (H) 0.00 - 0.04 K/UL    DF AUTOMATED     MAGNESIUM    Collection Time: 02/21/20  5:41 AM   Result Value Ref Range    Magnesium 2.5 (H) 1.6 - 2.4 mg/dL   METABOLIC PANEL, COMPREHENSIVE    Collection Time: 02/21/20  5:41 AM   Result Value Ref Range    Sodium 137 136 - 145 mmol/L Potassium 3.4 (L) 3.5 - 5.1 mmol/L    Chloride 104 97 - 108 mmol/L    CO2 27 21 - 32 mmol/L    Anion gap 6 5 - 15 mmol/L    Glucose 114 (H) 65 - 100 mg/dL    BUN 30 (H) 6 - 20 MG/DL    Creatinine 0.94 0.70 - 1.30 MG/DL    BUN/Creatinine ratio 32 (H) 12 - 20      GFR est AA >60 >60 ml/min/1.73m2    GFR est non-AA >60 >60 ml/min/1.73m2    Calcium 8.0 (L) 8.5 - 10.1 MG/DL    Bilirubin, total 0.4 0.2 - 1.0 MG/DL    ALT (SGPT) 87 (H) 12 - 78 U/L    AST (SGOT) 383 (H) 15 - 37 U/L    Alk. phosphatase 64 45 - 117 U/L    Protein, total 6.0 (L) 6.4 - 8.2 g/dL    Albumin 2.5 (L) 3.5 - 5.0 g/dL    Globulin 3.5 2.0 - 4.0 g/dL    A-G Ratio 0.7 (L) 1.1 - 2.2     CK    Collection Time: 02/21/20  5:41 AM   Result Value Ref Range    CK 7,417 (H) 39 - 308 U/L       Telemetry: normal sinus rhythm      Assessment:     Active Problems:    NSTEMI (non-ST elevated myocardial infarction) ([facility] (2/19/2020)        Plan:     Patient does not want any further cardiac work up. Continue current medicine. No plans for cardiac cath or stress test. D/w daughter. If family or patient changes mind and want to proceed, please inform us. Echo noted. Hold statin for now due to elevated LFTs. Will follow as needed over weekend. If still here Monday, will check back.

## 2020-02-21 NOTE — PROGRESS NOTES
Nephrology Progress Note  [facility]  [email or web address]                  Phone - [phone]       Patient: Roxy Conrad. Date- 2/21/2020        Admit Date: 2/19/2020  YOB: 1929             CC: Follow up for rhabdomyolysis          Subjective: Interval History:   -CK improving  Sodium dropped to 137  Potassium low 3.4  He is confused  ROS:- Can't access due to patient's current condition      Assessment & Plan:     ·  rhabdomyolysis-due to fall and Zocor  · Hypokalemia   · Continue current IV fluids MOHSEN likely due to dehydration and rhabdo  · Dementia  · Elevated troponin-14.20  · Status post fall  · History of hypertension  · Hyponatremia due to dehydration  · Hyperlipidemia    PLAN-   Continue current IV fluids    agree with starting amlodipine   Follow sodium level if sodium continues to drop change IV fluids to normal saline tomorrow   KCl 40 M EQ p.o.    Stop sodium bicarb after today's dose     Check CK level in the morning   Hold lisinopril   Hold  Zocor     Physical exam:   GEN: NAD, confused  NECK- Supple, no mass  RESP: Clear b/l, no wheezing, No accessory muscle use  CVS: S1,S2  RRR  ABDO: soft , non tender, No mass  NEURO: Can't access due to patient's current condition     EXT: No Edema     Care Plan discussed with: family  Objective:   Visit Vitals  /69 (BP 1 Location: Left arm, BP Patient Position: At rest)   Pulse 84   Temp 98.1 °F (36.7 °C)   Resp 18   Ht 5' 11\" (1.803 m)   Wt 74.3 kg (163 lb 12.8 oz)   SpO2 96%   BMI 22.85 kg/m²     Last 3 Recorded Weights in this Encounter    02/20/20 0152 02/20/20 0908 02/21/20 8531   Weight: 72.2 kg (159 lb 3.2 oz) 72.1 kg (159 lb) 74.3 kg (163 lb 12.8 oz)     02/19 1901 - 02/21 0700  In: 2899.5 [P.O.:50; I.V.:2849.5]  Out: 700 [Urine:700]    Intake/Output Summary (Last 24 hours) at 2/21/2020 1002  Last data filed at 2/21/2020 0400  Gross per 24 hour   Intake 1481.5 ml   Output 500 ml   Net 981.5 ml      Chart reviewed. Pertinent Notes reviewed. Medication list  reviewed   Current Facility-Administered Medications   Medication    sodium bicarbonate tablet 325 mg    sodium chloride (NS) flush 5-40 mL    sodium chloride (NS) flush 5-40 mL    acetaminophen (TYLENOL) tablet 650 mg    ondansetron (ZOFRAN) injection 4 mg    bisacodyL (DULCOLAX) tablet 5 mg    aspirin delayed-release tablet 81 mg    0.45% sodium chloride infusion    albuterol-ipratropium (DUO-NEB) 2.5 MG-0.5 MG/3 ML    guaiFENesin (ROBITUSSIN) 100 mg/5 mL oral liquid 400 mg    metoprolol tartrate (LOPRESSOR) tablet 12.5 mg    heparin 25,000 units in D5W 250 ml infusion    heparin (porcine) injection 2,000 Units    Or    heparin (porcine) injection 4,000 Units    cefTRIAXone (ROCEPHIN) 2 g in 0.9% sodium chloride (MBP/ADV) 50 mL    azithromycin (ZITHROMAX) 500 mg in 0.9% sodium chloride (MBP/ADV) 250 mL           Data Review :  Recent Labs     02/21/20  0541 02/20/20  0431 02/19/20  1746    139 133*   K 3.4* 3.4* 3.8    106 100   CO2 27 25 27   BUN 30* 32* 38*   CREA 0.94 0.90 1.24   * 104* 157*   CA 8.0* 8.2* 9.3   MG 2.5*  --   --    PHOS  --  3.1  --      Recent Labs     02/21/20  0541 02/20/20  0431 02/19/20  1746   WBC 12.9* 12.1* 15.3*   HGB 11.2* 11.6* 12.2   HCT 33.1* 34.1* 36.0*    308 344     No results for input(s): FE, TIBC, PSAT, FERR in the last 72 hours.    Recent Labs     02/21/20  0541 02/20/20  0431 02/20/20  0045 02/19/20  1746   CPK 7,417* 12,241*  --  3,805*   TROIQ  --  14.20* 14.20* 6.50*     Lab Results   Component Value Date/Time    Color YELLOW/STRAW 02/19/2020 07:23 PM    Appearance CLOUDY (A) 02/19/2020 07:23 PM    Specific gravity 1.022 02/19/2020 07:23 PM    pH (UA) 5.5 02/19/2020 07:23 PM    Protein 300 (A) 02/19/2020 07:23 PM    Glucose 250 (A) 02/19/2020 07:23 PM    Ketone 15 (A) 02/19/2020 07:23 PM    Bilirubin NEGATIVE 02/19/2020 07:23 PM    Urobilinogen 0.2 02/19/2020 07:23 PM    Nitrites NEGATIVE  02/19/2020 07:23 PM    Leukocyte Esterase SMALL (A) 02/19/2020 07:23 PM    Epithelial cells MODERATE 02/19/2020 07:23 PM    Bacteria NEGATIVE  02/19/2020 07:23 PM    WBC 0-4 02/19/2020 07:23 PM    RBC 20-50 02/19/2020 07:23 PM     Lab Results   Component Value Date/Time    Culture result: NO GROWTH 1 DAY 02/20/2020 12:45 AM     No results found for: SDRADHA  No results found for: Max Vargas  Results from [place] encounter on 02/19/20   XR HIPS BI W OR WO AP PELV    Narrative EXAM: XR HIPS BI W OR WO AP PELV    INDICATION: s/p fall. COMPARISON: None. FINDINGS: 3 views bilateral hips. AP view of the pelvis and frogleg lateral  views of both hips demonstrate no fracture, dislocation or other acute  abnormality. Multiple prostatic seed implants are noted. Impression IMPRESSION: No acute abnormality                     Сергей Elias MD  [facility]   [email or web address]  [facility]  [address], [address]  [place], [address]  Phone - [phone]         Fax - [phone]

## 2020-02-21 NOTE — PROGRESS NOTES
Hospitalist Progress Note    NAME: Chacorta Vanegas :  5/10/1929   MRN:  [id number]       Assessment / Plan:  NSTEMI: As per Cardiology medical management, c/w ASA, Plavix, BB, avoid statin for now due to Rhabdomyolysis. Cardiology in the case. D/c Heparin drip  Acute Rhabdomyolysis: c/w IVF, D/C  low dose Bicarbonate,  Nephrology help appreciated, CK trending down  RLL Pneumonia: c/w CTX/Z-max, bronchodilators, mucolytics, check sputum. HTN : increase  BB, monitor  Hypercholesteremia  D/c Statin    Hypokalemia: replace and monitor  S/p Fall: multiple bruises in legs, get wound care. Surrogate Decision Maker:Priyanka Veronica  [phone] [id number]  Code status: Full  Prophylaxis: lovenox  Recommended Disposition: SNF/LTC     STart PT/OT, may need SNF on D/C     Subjective:     Chief Complaint / Reason for Physician Visit  \"I'm feeling better\". Discussed with RN events overnight. Review of Systems:  Symptom Y/N Comments  Symptom Y/N Comments   Fever/Chills    Chest Pain     Poor Appetite    Edema     Cough y   Abdominal Pain     Sputum    Joint Pain y    SOB/PRADHAN y   Pruritis/Rash     Nausea/vomit    Tolerating PT/OT     Diarrhea    Tolerating Diet y    Constipation    Other       Could NOT obtain due to:      Objective:     VITALS:   Last 24hrs VS reviewed since prior progress note.  Most recent are:  Patient Vitals for the past 24 hrs:   Temp Pulse Resp BP SpO2   20 0728 98.1 °F (36.7 °C) 84 18 159/69 96 %   20 0400  69 16 135/66 96 %   20 0200 98.9 °F (37.2 °C) 76 16 141/82 96 %   20 0000 98.6 °F (37 °C) 74 18 146/72 97 %   20 2156  78 16 158/67 97 %   20 1900 98.4 °F (36.9 °C) 75 16 134/65 97 %   20 1501 99 °F (37.2 °C) 61 16 124/55 96 %   20 1200 98.2 °F (36.8 °C) 65 16 137/66 98 %       Intake/Output Summary (Last 24 hours) at 2020 1158  Last data filed at 2020 0400  Gross per 24 hour   Intake 1481.5 ml   Output 500 ml   Net 981.5 ml        PHYSICAL EXAM:  General: WD, WN. Alert, cooperative, no acute distress    EENT:  EOMI. Anicteric sclerae. MMM  Resp:  Coarse BS, rhonchi  CV:  Regular  rhythm,  No edema  GI:  Soft, Non distended, Non tender.  +Bowel sounds  Neurologic:  Alert and oriented X 2, normal speech,   Psych:   Fair  insight. Not anxious nor agitated  Skin:  No rashes. No jaundice    Reviewed most current lab test results and cultures  YES  Reviewed most current radiology test results   YES  Review and summation of old records today    NO  Reviewed patient's current orders and MAR    YES  PMH/SH reviewed - no change compared to H&P  ________________________________________________________________________  Care Plan discussed with:    Comments   Patient y    Family  y Priyanka ACOSTA y    Care Manager     Consultant                        Multidiciplinary team rounds were held today with , nursing, pharmacist and clinical coordinator. Patient's plan of care was discussed; medications were reviewed and discharge planning was addressed. ________________________________________________________________________  Total NON critical care TIME: 30 Minutes    Total CRITICAL CARE TIME Spent:   Minutes non procedure based      Comments   >50% of visit spent in counseling and coordination of care y    ________________________________________________________________________  Fox Sun MD     Procedures: see electronic medical records for all procedures/Xrays and details which were not copied into this note but were reviewed prior to creation of Plan. LABS:  I reviewed today's most current labs and imaging studies.   Pertinent labs include:  Recent Labs     02/21/20  0541 02/20/20  0431 02/19/20  1746   WBC 12.9* 12.1* 15.3*   HGB 11.2* 11.6* 12.2   HCT 33.1* 34.1* 36.0*    308 344     Recent Labs     02/21/20  0541 02/20/20  0431 02/19/20  1746    139 133*   K 3.4* 3.4* 3.8    106 100   CO2 27 25 27   * 104* 157* BUN 30* 32* 38*   CREA 0.94 0.90 1.24   CA 8.0* 8.2* 9.3   MG 2.5*  --   --    PHOS  --  3.1  --    ALB 2.5* 2.6* 3.5   TBILI 0.4 0.3 0.5   SGOT 383* 423* 135*   ALT 87* 80* 53       Signed: Primo Bermudez MD

## 2020-02-22 LAB
ALBUMIN SERPL-MCNC: 2.2 G/DL (ref 3.5–5)
ALBUMIN/GLOB SERPL: 0.6 {RATIO} (ref 1.1–2.2)
ALP SERPL-CCNC: 59 U/L (ref 45–117)
ALT SERPL-CCNC: 80 U/L (ref 12–78)
ANION GAP SERPL CALC-SCNC: 5 MMOL/L (ref 5–15)
AST SERPL-CCNC: 268 U/L (ref 15–37)
BASOPHILS # BLD: 0 K/UL (ref 0–0.1)
BASOPHILS NFR BLD: 0 % (ref 0–1)
BILIRUB SERPL-MCNC: 0.3 MG/DL (ref 0.2–1)
BUN SERPL-MCNC: 30 MG/DL (ref 6–20)
BUN/CREAT SERPL: 35 (ref 12–20)
CALCIUM SERPL-MCNC: 7.7 MG/DL (ref 8.5–10.1)
CHLORIDE SERPL-SCNC: 106 MMOL/L (ref 97–108)
CK SERPL-CCNC: 4310 U/L (ref 39–308)
CO2 SERPL-SCNC: 25 MMOL/L (ref 21–32)
CREAT SERPL-MCNC: 0.86 MG/DL (ref 0.7–1.3)
DIFFERENTIAL METHOD BLD: ABNORMAL
EOSINOPHIL # BLD: 0.1 K/UL (ref 0–0.4)
EOSINOPHIL NFR BLD: 0 % (ref 0–7)
ERYTHROCYTE [DISTWIDTH] IN BLOOD BY AUTOMATED COUNT: 14.6 % (ref 11.5–14.5)
GLOBULIN SER CALC-MCNC: 3.5 G/DL (ref 2–4)
GLUCOSE SERPL-MCNC: 115 MG/DL (ref 65–100)
HCT VFR BLD AUTO: 28.3 % (ref 36.6–50.3)
HGB BLD-MCNC: 9.7 G/DL (ref 12.1–17)
IMM GRANULOCYTES # BLD AUTO: 0.1 K/UL (ref 0–0.04)
IMM GRANULOCYTES NFR BLD AUTO: 0 % (ref 0–0.5)
LYMPHOCYTES # BLD: 0.8 K/UL (ref 0.8–3.5)
LYMPHOCYTES NFR BLD: 6 % (ref 12–49)
MAGNESIUM SERPL-MCNC: 2.4 MG/DL (ref 1.6–2.4)
MCH RBC QN AUTO: 30.7 PG (ref 26–34)
MCHC RBC AUTO-ENTMCNC: 34.3 G/DL (ref 30–36.5)
MCV RBC AUTO: 89.6 FL (ref 80–99)
MONOCYTES # BLD: 1 K/UL (ref 0–1)
MONOCYTES NFR BLD: 8 % (ref 5–13)
NEUTS SEG # BLD: 10.2 K/UL (ref 1.8–8)
NEUTS SEG NFR BLD: 84 % (ref 32–75)
NRBC # BLD: 0 K/UL (ref 0–0.01)
NRBC BLD-RTO: 0 PER 100 WBC
PLATELET # BLD AUTO: 275 K/UL (ref 150–400)
PMV BLD AUTO: 9.5 FL (ref 8.9–12.9)
POTASSIUM SERPL-SCNC: 3.9 MMOL/L (ref 3.5–5.1)
PROT SERPL-MCNC: 5.7 G/DL (ref 6.4–8.2)
RBC # BLD AUTO: 3.16 M/UL (ref 4.1–5.7)
SODIUM SERPL-SCNC: 136 MMOL/L (ref 136–145)
WBC # BLD AUTO: 12.1 K/UL (ref 4.1–11.1)

## 2020-02-22 PROCEDURE — 74011250636 HC RX REV CODE- 250/636: Performed by: EMERGENCY MEDICINE

## 2020-02-22 PROCEDURE — 97530 THERAPEUTIC ACTIVITIES: CPT

## 2020-02-22 PROCEDURE — 82550 ASSAY OF CK (CPK): CPT

## 2020-02-22 PROCEDURE — 36415 COLL VENOUS BLD VENIPUNCTURE: CPT

## 2020-02-22 PROCEDURE — 74011250637 HC RX REV CODE- 250/637: Performed by: INTERNAL MEDICINE

## 2020-02-22 PROCEDURE — 74011000258 HC RX REV CODE- 258: Performed by: EMERGENCY MEDICINE

## 2020-02-22 PROCEDURE — 74011250637 HC RX REV CODE- 250/637: Performed by: NURSE PRACTITIONER

## 2020-02-22 PROCEDURE — 97162 PT EVAL MOD COMPLEX 30 MIN: CPT

## 2020-02-22 PROCEDURE — 97110 THERAPEUTIC EXERCISES: CPT

## 2020-02-22 PROCEDURE — 83735 ASSAY OF MAGNESIUM: CPT

## 2020-02-22 PROCEDURE — 85025 COMPLETE CBC W/AUTO DIFF WBC: CPT

## 2020-02-22 PROCEDURE — 65660000000 HC RM CCU STEPDOWN

## 2020-02-22 PROCEDURE — 74011250636 HC RX REV CODE- 250/636: Performed by: INTERNAL MEDICINE

## 2020-02-22 PROCEDURE — 80053 COMPREHEN METABOLIC PANEL: CPT

## 2020-02-22 RX ORDER — SODIUM CHLORIDE 9 MG/ML
50 INJECTION, SOLUTION INTRAVENOUS CONTINUOUS
Status: DISCONTINUED | OUTPATIENT
Start: 2020-02-22 | End: 2020-02-23

## 2020-02-22 RX ADMIN — CEFTRIAXONE SODIUM 2 G: 2 INJECTION, POWDER, FOR SOLUTION INTRAMUSCULAR; INTRAVENOUS at 23:49

## 2020-02-22 RX ADMIN — GUAIFENESIN 400 MG: 200 SOLUTION ORAL at 17:05

## 2020-02-22 RX ADMIN — Medication 10 ML: at 07:12

## 2020-02-22 RX ADMIN — ACETAMINOPHEN 650 MG: 325 TABLET ORAL at 17:05

## 2020-02-22 RX ADMIN — METOPROLOL TARTRATE 25 MG: 25 TABLET ORAL at 09:21

## 2020-02-22 RX ADMIN — METOPROLOL TARTRATE 25 MG: 25 TABLET ORAL at 20:59

## 2020-02-22 RX ADMIN — ENOXAPARIN SODIUM 40 MG: 40 INJECTION SUBCUTANEOUS at 11:48

## 2020-02-22 RX ADMIN — AMLODIPINE BESYLATE 2.5 MG: 5 TABLET ORAL at 09:21

## 2020-02-22 RX ADMIN — AZITHROMYCIN MONOHYDRATE 500 MG: 500 INJECTION, POWDER, LYOPHILIZED, FOR SOLUTION INTRAVENOUS at 00:06

## 2020-02-22 RX ADMIN — ACETAMINOPHEN 650 MG: 325 TABLET ORAL at 20:59

## 2020-02-22 RX ADMIN — SODIUM CHLORIDE 75 ML/HR: 900 INJECTION, SOLUTION INTRAVENOUS at 08:38

## 2020-02-22 RX ADMIN — Medication 10 ML: at 14:03

## 2020-02-22 RX ADMIN — Medication 10 ML: at 22:00

## 2020-02-22 RX ADMIN — CLOPIDOGREL BISULFATE 75 MG: 75 TABLET ORAL at 09:22

## 2020-02-22 RX ADMIN — GUAIFENESIN 400 MG: 200 SOLUTION ORAL at 09:29

## 2020-02-22 RX ADMIN — ASPIRIN 81 MG: 81 TABLET ORAL at 09:22

## 2020-02-22 NOTE — PROGRESS NOTES
Bedside and Verbal shift change report given to [address] [place] [address] (oncoming nurse) by Kassi Adams RN (offgoing nurse). Report included the following information SBAR, Kardex, Intake/Output, MAR, Recent Results and Cardiac Rhythm 1st degree block. 0000: Report given to [employer].

## 2020-02-22 NOTE — PROGRESS NOTES
Occupational Therapy    Orders received chart reviewed, patient recently back to bed from being up all morning and participating in PT, family members voiced concerns over need for rest as is his normal routine at home. Educated on role of OT. Family voiced R hand pain limiting earlier, encouraged elevation and gentle ROM. PLOF was living alone in own home with children checking in often, was able to do all self care including bathing/showering weekly with a family member present, was doing minimal cooking/reheating recently and being more weak. Will continue to follow up for OT evaluation. [facility].  MS Joao OTR/L

## 2020-02-22 NOTE — PROGRESS NOTES
Problem: Mobility Impaired (Adult and Pediatric)  Goal: *Acute Goals and Plan of Care (Insert Text)  Description  FUNCTIONAL STATUS PRIOR TO ADMISSION: Patient was modified independent using a single point cane and furniture for functional mobility. Needed supervision for bathing. Was able to dress himself. HOME SUPPORT PRIOR TO ADMISSION: The patient lived alone with family/friends to provide assistance as needed. Physical Therapy Goals  Initiated 2/22/2020  1. Patient will move from supine to sit and sit to supine , scoot up and down and roll side to side in bed with standby assistance/set-up within 7 day(s). 2.  Patient will transfer from bed to chair and chair to bed with contact guard assist using the least restrictive device within 7 day(s). 3.  Patient will perform sit to stand with minimal assistance/contact guard assist within 7 day(s). 4.  Patient will ambulate with contact guard assist for 60 feet with the least restrictive device within 7 day(s). 5.  Patient will ascend/descend 4 stairs with 1 handrail(s) with minimal assistance/contact guard assist within 7 day(s). Outcome: Not Met   PHYSICAL THERAPY EVALUATION  Patient: Leslie Garcia (80 y.o. male)  Date: 2/22/2020  Primary Diagnosis: NSTEMI (non-ST elevated myocardial infarction) [facility]) [I21.4]        Precautions:  Fall, Skin, Bed Alarm(dementia)      ASSESSMENT  Based on the objective data described below, the patient presents with intermittent confusion, generalized weakness with R extremities weakest due to pain from home fall, decreased activity tolerance, decreased balance reactions and impaired mobility skills. He tolerated gentle exercises in bed followed by assisted transfer to sitting on edge of bed. He was easily startled by position changes and very focused on protecting his R hand from pain. Multiple attempts to stand on his own failed, but able to stand with mod a x 2.  Took 2 attempts to transfer from bed to recliner next to bed. He was sitting comfortably with daughter at bedside when the session ended. Education: fall precautions, role of PT, exercises, transfer techniques. Will need assistance x 2 for nursing staff to safely transfer patient back to bed - updated RN. Current Level of Function Impacting Discharge (mobility/balance): mod/max a x 2    Functional Outcome Measure: The patient scored 20/100 on the Barthel outcome measure which is indicative of 80% functional impairment. Other factors to consider for discharge: lives alone, h/o falls, dementia, good family support     Patient will benefit from skilled therapy intervention to address the above noted impairments. PLAN :  Recommendations and Planned Interventions: bed mobility training, transfer training, gait training, therapeutic exercises, edema management/control, patient and family training/education, and therapeutic activities      Frequency/Duration: Patient will be followed by physical therapy:  4 times a week to address goals. Recommendation for discharge: (in order for the patient to meet his/her long term goals)  Therapy up to 5 days/week in SNF setting    This discharge recommendation:  Has not yet been discussed the attending provider and/or case management    IF patient discharges home will need the following DME: wheelchair         SUBJECTIVE:   Patient stated how long do I have to stay in the chair.     OBJECTIVE DATA SUMMARY:   HISTORY:    Past Medical History:   Diagnosis Date    High blood pressure     High cholesterol     Prostate cancer ([facility] [address]    History reviewed. No pertinent surgical history.     Personal factors and/or comorbidities impacting plan of care: dementia, h/o falls    Home Situation  Home Environment: Private residence  # Steps to Enter: 4  Rails to Enter: Yes  Hand Rails : Bilateral  Wheelchair Ramp: No  One/Two Story Residence: Two story, live on 1st floor  # of Interior Steps: 12  Living Alone: Yes(family checks on patient daily)  Support Systems: Family member(s), Friends \ neighbors  Patient Expects to be Discharged to[de-identified] Unknown  Current DME Used/Available at Home: Nanine Likes, straight, Luellen Canavan, rolling(furniture/walls)  Tub or Shower Type: Shower    EXAMINATION/PRESENTATION/DECISION MAKING:   Critical Behavior:  Neurologic State: Confused, Alert  Orientation Level: Oriented to time, Oriented to situation, Oriented to place, Oriented to person  Cognition: Memory loss, Follows commands     Hearing: Auditory  Auditory Impairment: None  Skin:  R arm/hand severely briused; R hip/thigh bruised, dressing R leg. Edema: R arm and RLE  Range Of Motion:  AROM: Generally decreased, functional           PROM: Generally decreased, functional           Strength:    Strength: Generally decreased, functional                    Tone & Sensation:   Tone: Normal              Sensation: Intact               Coordination:  Coordination: Generally decreased, functional(R hand/wrist and R hip/knee limited by pain)  Vision:      Functional Mobility:  Bed Mobility:  Rolling: Moderate assistance  Supine to Sit: Moderate assistance;Maximum assistance     Scooting: Moderate assistance  Transfers:  Sit to Stand: Moderate assistance;Assist x2  Stand to Sit: Moderate assistance        Bed to Chair: Moderate assistance(bear hug technique)              Balance:   Sitting: Impaired  Sitting - Static: Fair (occasional)  Sitting - Dynamic: Poor (constant support)  Standing: Impaired  Standing - Static: Constant support;Poor  Standing - Dynamic : Constant support;Poor  Ambulation/Gait Training:              Gait Description (WDL): (deferred due to weakness and poor standing balance)     Right Side Weight Bearing: Full  Left Side Weight Bearing: Full                                Therapeutic Exercises:    Ankle pumps, quad sets, heel slides, hip abd. 5-7 reps each due to low attention span    Functional Measure:  Barthel Index:    Bathin  Bladder: 5  Bowels: 10  Groomin  Dressin  Feedin  Mobility: 0  Stairs: 0  Toilet Use: 0  Transfer (Bed to Chair and Back): 5  Total: 20/100       The Barthel ADL Index: Guidelines  1. The index should be used as a record of what a patient does, not as a record of what a patient could do. 2. The main aim is to establish degree of independence from any help, physical or verbal, however minor and for whatever reason. 3. The need for supervision renders the patient not independent. 4. A patient's performance should be established using the best available evidence. Asking the patient, friends/relatives and nurses are the usual sources, but direct observation and common sense are also important. However direct testing is not needed. 5. Usually the patient's performance over the preceding 24-48 hours is important, but occasionally longer periods will be relevant. 6. Middle categories imply that the patient supplies over 50 per cent of the effort. 7. Use of aids to be independent is allowed. Ángela Choi., Barthel, D.W. ([id number]). Functional evaluation: the Barthel Index. [facility] [address] [facility] (14)2. [facility] JULIAN Waterman, Lex Melendez.[facility]., [place], [address] (). Measuring the change indisability after inpatient rehabilitation; comparison of the responsiveness of the Barthel Index and Functional [place] Measure. Journal of Neurology, Neurosurgery, and Psychiatry, 66(4), 143-541. Ida Hays, CELIO.J.YANCI, JOSE LUIS DelacruzJ.LOVELY, & Maliha Skaggs M.A. (2004.) Assessment of post-stroke quality of life in cost-effectiveness studies: The usefulness of the Barthel Index and the EuroQoL-5D.  Quality of Life Research, 15, 371-18        Physical Therapy Evaluation Charge Determination   History Examination Presentation Decision-Making   HIGH Complexity :3+ comorbidities / personal factors will impact the outcome/ POC  HIGH Complexity : 4+ Standardized tests and measures addressing body structure, function, activity limitation and / or participation in recreation  MEDIUM Complexity : Evolving with changing characteristics  Other outcome measures Barthel  MEDIUM      Based on the above components, the patient evaluation is determined to be of the following complexity level: MEDIUM    Pain Ratin-5/10 R hand/wrist    Activity Tolerance:   Fair, SpO2 stable on RA, and requires rest breaks  Please refer to the flowsheet for vital signs taken during this treatment. After treatment patient left in no apparent distress:   Sitting in chair, Call bell within reach, and Caregiver / family present    COMMUNICATION/EDUCATION:   The patients plan of care was discussed with: Occupational Therapist, Registered Nurse, and Rehabilitation Attendant. Fall prevention education was provided and the patient/caregiver indicated understanding., Patient/family have participated as able in goal setting and plan of care. , and Patient/family agree to work toward stated goals and plan of care.     Thank you for this referral.  Amaury Padgett, PT   Time Calculation: 33 mins

## 2020-02-22 NOTE — PROGRESS NOTES
Hospitalist Progress Note    NAME: Leslie Hassan. :  5/10/1929   MRN:  [id number]       Assessment / Plan:  NSTEMI: As per Cardiology medical management, c/w ASA, Plavix, BB, avoid statin for now due to Rhabdomyolysis. Cardiology in the case. D/vy Heparin drip  Acute Rhabdomyolysis: c/w IVF, D/C  low dose Bicarbonate,  Nephrology help appreciated, CK trending down to 4000 today  RLL Pneumonia: c/w CTX/Z-max, bronchodilators, mucolytics, F/U sputum. HTN : increased  BB, monitor, so far good control  Hypercholesteremia  D/c Statin    Hypokalemia: replace and monitor  S/p Fall: multiple bruises in legs, get wound care. Dementia/Cerebral Atrophy: noted in CT,  at times, monitor  Surrogate Decision Maker:Priyanka Veronica  [phone]  Code status: Full  Prophylaxis: lovenox  Recommended Disposition: SNF/LTC     Start PT/OT, may need SNF on D/C, if stable may plan for Monday     Subjective:     Chief Complaint / Reason for Physician Visit  \"My arm hurts\". Discussed with RN events overnight. Review of Systems:  Symptom Y/N Comments  Symptom Y/N Comments   Fever/Chills    Chest Pain     Poor Appetite    Edema     Cough y   Abdominal Pain     Sputum    Joint Pain y    SOB/PRADHAN y   Pruritis/Rash     Nausea/vomit    Tolerating PT/OT     Diarrhea    Tolerating Diet y    Constipation    Other       Could NOT obtain due to:      Objective:     VITALS:   Last 24hrs VS reviewed since prior progress note. Most recent are:  Patient Vitals for the past 24 hrs:   Temp Pulse Resp BP SpO2   20 0755 98.9 °F (37.2 °C) 63 16 126/58    20 0320 98.8 °F (37.1 °C) (!) 58 18 109/53 96 %   20 2353 99.2 °F (37.3 °C) 69 20 138/65 100 %   20 1943 99.8 °F (37.7 °C) 68 20 133/63    20 1704  67  138/72    20 1443 98.2 °F (36.8 °C) 70 20 138/67 96 %     No intake or output data in the 24 hours ending 02/22/20 1115     PHYSICAL EXAM:  General: WD, WN.  Alert, cooperative, no acute distress    EENT:  EOMI. Anicteric sclerae. MMM  Resp:  Coarse BS, rhonchi  CV:  Regular  rhythm,  No edema  GI:  Soft, Non distended, Non tender.  +Bowel sounds  Neurologic:  Alert and oriented X 2, normal speech,   Psych:   Fair  insight. Not anxious nor agitated  Skin:  No rashes. No jaundice    Reviewed most current lab test results and cultures  YES  Reviewed most current radiology test results   YES  Review and summation of old records today    NO  Reviewed patient's current orders and MAR    YES  PMH/SH reviewed - no change compared to H&P  ________________________________________________________________________  Care Plan discussed with:    Comments   Patient y    Family  y Priyanka ACOSTA y    Care Manager     Consultant                        Multidiciplinary team rounds were held today with , nursing, pharmacist and clinical coordinator. Patient's plan of care was discussed; medications were reviewed and discharge planning was addressed. ________________________________________________________________________  Total NON critical care TIME: 30 Minutes    Total CRITICAL CARE TIME Spent:   Minutes non procedure based      Comments   >50% of visit spent in counseling and coordination of care y    ________________________________________________________________________  Rosie Boyd MD     Procedures: see electronic medical records for all procedures/Xrays and details which were not copied into this note but were reviewed prior to creation of Plan. LABS:  I reviewed today's most current labs and imaging studies.   Pertinent labs include:  Recent Labs     02/22/20  0249 02/21/20  0541 02/20/20  0431   WBC 12.1* 12.9* 12.1*   HGB 9.7* 11.2* 11.6*   HCT 28.3* 33.1* 34.1*    296 308     Recent Labs     02/22/20  0249 02/21/20  0541 02/20/20  0431    137 139   K 3.9 3.4* 3.4*    104 106   CO2 25 27 25   * 114* 104*   BUN 30* 30* 32*   CREA 0.86 0.94 0.90   CA 7.7* 8. 0* 8.2*   MG 2.4 2.5*  --    PHOS  --   --  3.1   ALB 2.2* 2.5* 2.6*   TBILI 0.3 0.4 0.3   SGOT 268* 383* 423*   ALT 80* 87* 80*       Signed: Vladimir Marley MD

## 2020-02-22 NOTE — PROGRESS NOTES
CM spoke with the patient's daughter, Juan Longo (phone: [phone]), via phone to discuss PT recommendation for SNF placement. The patient's daughter does feel that the patient would benefit from SNF placement upon d/c. CM discussed various options for SNF placement and the patient's daughter stated that she would like to discuss options with her brother and her other sister. CM e-mailed Mrs. Ayana Gray the SNF listing with star ratings for her to review. Weekend CM is to follow-up with the patient's daughter tomorrow to discuss her choices for SNF placement and will send the patient's referral for review. Please note, the patient has [facility] and will require authorization prior to d/c to SNF. The patient will also require more than 1 therapy evaluation for SNF placement in order for [employer] to authorize SNF stay. CM will continue to monitor the patient's chart for completed OT evaluation.     Vesna Rodriguez 169, 06 [employer]

## 2020-02-22 NOTE — PROGRESS NOTES
Nephrology Progress Note  [facility]  [email or web address]                  Phone - [phone]       Patient: Belia Cochran. Date- 2/22/2020        Admit Date: 2/19/2020  YOB: 1929             CC: Follow up for RHABDOMYOLYSIS          Subjective: Interval History:   -CK IMPROVED TO 4 K  Sodium dropped to 136- HE IS ON . 4 5 NACL   Potassium IMPROVED  He is confused  ROS:- Can't access due to patient's current condition      Assessment & Plan:     ·  rhabdomyolysis-due to fall and Zocor  · Hypokalemia  · hypertension  · dementia  · Elevated troponin-14.20  · Status post fall  · Hyponatremia due to dehydration  · Hyperlipidemia    PLAN-   Change ivf to .45 nacl   Avoid hypotension   Check bmp in am    Check CK level in the morning   Hold lisinopril   Hold  Zocor     Physical exam:   GEN: nad, confused  NECK- Supple, no mass  RESP: CLEAR b/l, no wheezing, No accessory muscle use  CVS: s1,s2,rrr  ABDO: soft , NT   NEURO: Can't access due to patient's current condition   EXT: No Edema     Care Plan discussed with:  Objective:   Visit Vitals  /53   Pulse (!) 58   Temp 98.8 °F (37.1 °C)   Resp 18   Ht 5' 11\" (1.803 m)   Wt 74.8 kg (165 lb)   SpO2 96%   BMI 23.01 kg/m²     Last 3 Recorded Weights in this Encounter    02/20/20 0908 02/21/20 0638 02/22/20 3229   Weight: 72.1 kg (159 lb) 74.3 kg (163 lb 12.8 oz) 74.8 kg (165 lb)     02/20 1901 - 02/22 0700  In: 1481.5 [I.V.:1481.5]  Out: -   No intake or output data in the 24 hours ending 02/22/20 0737   Chart reviewed. Pertinent Notes reviewed.        Medication list  reviewed   Current Facility-Administered Medications   Medication    0.9% sodium chloride infusion    clopidogreL (PLAVIX) tablet 75 mg    enoxaparin (LOVENOX) injection 40 mg    metoprolol tartrate (LOPRESSOR) tablet 25 mg    amLODIPine (NORVASC) tablet 2.5 mg    sodium chloride (NS) flush 5-40 mL    sodium chloride (NS) flush 5-40 mL    acetaminophen (TYLENOL) tablet 650 mg    ondansetron (ZOFRAN) injection 4 mg    bisacodyL (DULCOLAX) tablet 5 mg    aspirin delayed-release tablet 81 mg    albuterol-ipratropium (DUO-NEB) 2.5 MG-0.5 MG/3 ML    guaiFENesin (ROBITUSSIN) 100 mg/5 mL oral liquid 400 mg    cefTRIAXone (ROCEPHIN) 2 g in 0.9% sodium chloride (MBP/ADV) 50 mL    azithromycin (ZITHROMAX) 500 mg in 0.9% sodium chloride (MBP/ADV) 250 mL           Data Review :  Recent Labs     02/22/20 0249 02/21/20  0541 02/20/20  0431    137 139   K 3.9 3.4* 3.4*    104 106   CO2 25 27 25   BUN 30* 30* 32*   CREA 0.86 0.94 0.90   * 114* 104*   CA 7.7* 8.0* 8.2*   MG 2.4 2.5*  --    PHOS  --   --  3.1     Recent Labs     02/22/20 0249 02/21/20  0541 02/20/20  0431   WBC 12.1* 12.9* 12.1*   HGB 9.7* 11.2* 11.6*   HCT 28.3* 33.1* 34.1*    296 308     No results for input(s): FE, TIBC, PSAT, FERR in the last 72 hours.    Recent Labs     02/22/20 0249 02/21/20  0541 02/20/20  0431 02/20/20  0045 02/19/20  1746   CPK 4,310* 7,417* 12,241*  --  3,805*   TROIQ  --   --  14.20* 14.20* 6.50*     Lab Results   Component Value Date/Time    Color YELLOW/STRAW 02/19/2020 07:23 PM    Appearance CLOUDY (A) 02/19/2020 07:23 PM    Specific gravity 1.022 02/19/2020 07:23 PM    pH (UA) 5.5 02/19/2020 07:23 PM    Protein 300 (A) 02/19/2020 07:23 PM    Glucose 250 (A) 02/19/2020 07:23 PM    Ketone 15 (A) 02/19/2020 07:23 PM    Bilirubin NEGATIVE  02/19/2020 07:23 PM    Urobilinogen 0.2 02/19/2020 07:23 PM    Nitrites NEGATIVE  02/19/2020 07:23 PM    Leukocyte Esterase SMALL (A) 02/19/2020 07:23 PM    Epithelial cells MODERATE 02/19/2020 07:23 PM    Bacteria NEGATIVE  02/19/2020 07:23 PM    WBC 0-4 02/19/2020 07:23 PM    RBC 20-50 02/19/2020 07:23 PM     Lab Results   Component Value Date/Time    Culture result: NO GROWTH 1 DAY 02/20/2020 12:45 AM     No results found for: SDES  No results found for: GOLDIE, Valentin Kinsey  Results from Hospital Encounter encounter on 02/19/20   XR HAND RT AP/LAT    Narrative EXAM: XR HAND RT AP/LAT    INDICATION: ight wrist swollen. COMPARISON: None. FINDINGS: Two views of the right hand demonstrate no fracture or other acute  osseous or articular abnormality. There is soft tissue swelling about the radial  aspect of the thumb. Severe first CMC degenerative disease, and scattered IP  joint degenerative disease. Osteopenia. Impression IMPRESSION:   1. No acute abnormality. Soft tissue swelling about the radial aspect of the  thumb. 2. Severe first CMC degenerative disease. 3. Osteopenia. Rosario Mccarthy MD  [facility]   [email or web address]  [facility]  [facility] [address] [place], [address]  Phone - [phone]         Fax - [phone]

## 2020-02-22 NOTE — PROGRESS NOTES
Orders received, chart reviewed and patient evaluated by physical therapy. Pending progression with skilled acute physical therapy, recommend:  Therapy up to 5 days/week in SNF setting     Recommend with nursing patient to complete as able in order to maintain strength, endurance and independence: OOB to chair 2x/day with assistance x 2. Currently unable to use RW due to R hand soreness. Thank you for your assistance. Full evaluation to follow.      Nereyda Carmichael, PT

## 2020-02-23 LAB
ALBUMIN SERPL-MCNC: 1.2 G/DL (ref 3.5–5)
ALBUMIN SERPL-MCNC: 2.3 G/DL (ref 3.5–5)
ALBUMIN/GLOB SERPL: 0.6 {RATIO} (ref 1.1–2.2)
ALBUMIN/GLOB SERPL: 0.7 {RATIO} (ref 1.1–2.2)
ALP SERPL-CCNC: 34 U/L (ref 45–117)
ALP SERPL-CCNC: 67 U/L (ref 45–117)
ALT SERPL-CCNC: 44 U/L (ref 12–78)
ALT SERPL-CCNC: 76 U/L (ref 12–78)
ANION GAP SERPL CALC-SCNC: 4 MMOL/L (ref 5–15)
ANION GAP SERPL CALC-SCNC: 7 MMOL/L (ref 5–15)
AST SERPL-CCNC: 102 U/L (ref 15–37)
AST SERPL-CCNC: 162 U/L (ref 15–37)
BASOPHILS # BLD: 0.1 K/UL (ref 0–0.1)
BASOPHILS NFR BLD: 1 % (ref 0–1)
BILIRUB SERPL-MCNC: 0.2 MG/DL (ref 0.2–1)
BILIRUB SERPL-MCNC: 0.3 MG/DL (ref 0.2–1)
BUN SERPL-MCNC: 19 MG/DL (ref 6–20)
BUN SERPL-MCNC: 25 MG/DL (ref 6–20)
BUN/CREAT SERPL: 28 (ref 12–20)
BUN/CREAT SERPL: 42 (ref 12–20)
CALCIUM SERPL-MCNC: 8.7 MG/DL (ref 8.5–10.1)
CALCIUM SERPL-MCNC: <5 MG/DL (ref 8.5–10.1)
CHLORIDE SERPL-SCNC: 106 MMOL/L (ref 97–108)
CHLORIDE SERPL-SCNC: 121 MMOL/L (ref 97–108)
CK SERPL-CCNC: 1279 U/L (ref 39–308)
CO2 SERPL-SCNC: 17 MMOL/L (ref 21–32)
CO2 SERPL-SCNC: 24 MMOL/L (ref 21–32)
CREAT SERPL-MCNC: 0.45 MG/DL (ref 0.7–1.3)
CREAT SERPL-MCNC: 0.88 MG/DL (ref 0.7–1.3)
DIFFERENTIAL METHOD BLD: ABNORMAL
EOSINOPHIL # BLD: 0.3 K/UL (ref 0–0.4)
EOSINOPHIL NFR BLD: 3 % (ref 0–7)
ERYTHROCYTE [DISTWIDTH] IN BLOOD BY AUTOMATED COUNT: 15 % (ref 11.5–14.5)
GLOBULIN SER CALC-MCNC: 2.1 G/DL (ref 2–4)
GLOBULIN SER CALC-MCNC: 3.5 G/DL (ref 2–4)
GLUCOSE SERPL-MCNC: 115 MG/DL (ref 65–100)
GLUCOSE SERPL-MCNC: 72 MG/DL (ref 65–100)
HCT VFR BLD AUTO: 23.8 % (ref 36.6–50.3)
HGB BLD-MCNC: 8.1 G/DL (ref 12.1–17)
IMM GRANULOCYTES # BLD AUTO: 0 K/UL (ref 0–0.04)
IMM GRANULOCYTES NFR BLD AUTO: 1 % (ref 0–0.5)
LYMPHOCYTES # BLD: 0.9 K/UL (ref 0.8–3.5)
LYMPHOCYTES NFR BLD: 11 % (ref 12–49)
MAGNESIUM SERPL-MCNC: 1.6 MG/DL (ref 1.6–2.4)
MCH RBC QN AUTO: 30.7 PG (ref 26–34)
MCHC RBC AUTO-ENTMCNC: 34 G/DL (ref 30–36.5)
MCV RBC AUTO: 90.2 FL (ref 80–99)
MONOCYTES # BLD: 0.8 K/UL (ref 0–1)
MONOCYTES NFR BLD: 9 % (ref 5–13)
NEUTS SEG # BLD: 6.1 K/UL (ref 1.8–8)
NEUTS SEG NFR BLD: 75 % (ref 32–75)
NRBC # BLD: 0 K/UL (ref 0–0.01)
NRBC BLD-RTO: 0 PER 100 WBC
PLATELET # BLD AUTO: 249 K/UL (ref 150–400)
PMV BLD AUTO: 9.8 FL (ref 8.9–12.9)
POTASSIUM SERPL-SCNC: 2.8 MMOL/L (ref 3.5–5.1)
POTASSIUM SERPL-SCNC: 5.3 MMOL/L (ref 3.5–5.1)
PROT SERPL-MCNC: 3.3 G/DL (ref 6.4–8.2)
PROT SERPL-MCNC: 5.8 G/DL (ref 6.4–8.2)
RBC # BLD AUTO: 2.64 M/UL (ref 4.1–5.7)
SODIUM SERPL-SCNC: 134 MMOL/L (ref 136–145)
SODIUM SERPL-SCNC: 145 MMOL/L (ref 136–145)
WBC # BLD AUTO: 8.1 K/UL (ref 4.1–11.1)

## 2020-02-23 PROCEDURE — 74011250637 HC RX REV CODE- 250/637: Performed by: INTERNAL MEDICINE

## 2020-02-23 PROCEDURE — 97535 SELF CARE MNGMENT TRAINING: CPT

## 2020-02-23 PROCEDURE — 74011000258 HC RX REV CODE- 258: Performed by: EMERGENCY MEDICINE

## 2020-02-23 PROCEDURE — 74011250636 HC RX REV CODE- 250/636: Performed by: INTERNAL MEDICINE

## 2020-02-23 PROCEDURE — 65660000001 HC RM ICU INTERMED STEPDOWN

## 2020-02-23 PROCEDURE — 36415 COLL VENOUS BLD VENIPUNCTURE: CPT

## 2020-02-23 PROCEDURE — 74011250636 HC RX REV CODE- 250/636: Performed by: EMERGENCY MEDICINE

## 2020-02-23 PROCEDURE — 74011000258 HC RX REV CODE- 258: Performed by: INTERNAL MEDICINE

## 2020-02-23 PROCEDURE — 97166 OT EVAL MOD COMPLEX 45 MIN: CPT

## 2020-02-23 PROCEDURE — 82550 ASSAY OF CK (CPK): CPT

## 2020-02-23 PROCEDURE — 85025 COMPLETE CBC W/AUTO DIFF WBC: CPT

## 2020-02-23 PROCEDURE — 83735 ASSAY OF MAGNESIUM: CPT

## 2020-02-23 PROCEDURE — 80053 COMPREHEN METABOLIC PANEL: CPT

## 2020-02-23 PROCEDURE — 74011250637 HC RX REV CODE- 250/637: Performed by: NURSE PRACTITIONER

## 2020-02-23 RX ORDER — MAGNESIUM SULFATE HEPTAHYDRATE 40 MG/ML
4 INJECTION, SOLUTION INTRAVENOUS
Status: COMPLETED | OUTPATIENT
Start: 2020-02-23 | End: 2020-02-23

## 2020-02-23 RX ORDER — POTASSIUM CHLORIDE 20 MEQ/1
40 TABLET, EXTENDED RELEASE ORAL
Status: COMPLETED | OUTPATIENT
Start: 2020-02-23 | End: 2020-02-23

## 2020-02-23 RX ORDER — POTASSIUM CHLORIDE 7.45 MG/ML
10 INJECTION INTRAVENOUS
Status: COMPLETED | OUTPATIENT
Start: 2020-02-23 | End: 2020-02-23

## 2020-02-23 RX ADMIN — POTASSIUM CHLORIDE 10 MEQ: 7.46 INJECTION, SOLUTION INTRAVENOUS at 10:14

## 2020-02-23 RX ADMIN — AMLODIPINE BESYLATE 2.5 MG: 5 TABLET ORAL at 09:03

## 2020-02-23 RX ADMIN — CEFTRIAXONE SODIUM 2 G: 2 INJECTION, POWDER, FOR SOLUTION INTRAMUSCULAR; INTRAVENOUS at 23:00

## 2020-02-23 RX ADMIN — Medication 10 ML: at 06:00

## 2020-02-23 RX ADMIN — ENOXAPARIN SODIUM 40 MG: 40 INJECTION SUBCUTANEOUS at 11:20

## 2020-02-23 RX ADMIN — POTASSIUM CHLORIDE 10 MEQ: 7.46 INJECTION, SOLUTION INTRAVENOUS at 11:20

## 2020-02-23 RX ADMIN — GUAIFENESIN 400 MG: 200 SOLUTION ORAL at 16:27

## 2020-02-23 RX ADMIN — CLOPIDOGREL BISULFATE 75 MG: 75 TABLET ORAL at 09:03

## 2020-02-23 RX ADMIN — Medication 10 ML: at 21:40

## 2020-02-23 RX ADMIN — MAGNESIUM SULFATE HEPTAHYDRATE 4 G: 40 INJECTION, SOLUTION INTRAVENOUS at 08:54

## 2020-02-23 RX ADMIN — AZITHROMYCIN MONOHYDRATE 500 MG: 500 INJECTION, POWDER, LYOPHILIZED, FOR SOLUTION INTRAVENOUS at 01:30

## 2020-02-23 RX ADMIN — POTASSIUM CHLORIDE 10 MEQ: 7.46 INJECTION, SOLUTION INTRAVENOUS at 08:56

## 2020-02-23 RX ADMIN — CALCIUM GLUCONATE 4 G: 98 INJECTION, SOLUTION INTRAVENOUS at 08:55

## 2020-02-23 RX ADMIN — METOPROLOL TARTRATE 25 MG: 25 TABLET ORAL at 09:03

## 2020-02-23 RX ADMIN — ACETAMINOPHEN 650 MG: 325 TABLET ORAL at 11:20

## 2020-02-23 RX ADMIN — GUAIFENESIN 400 MG: 200 SOLUTION ORAL at 09:03

## 2020-02-23 RX ADMIN — POTASSIUM CHLORIDE 10 MEQ: 7.46 INJECTION, SOLUTION INTRAVENOUS at 07:47

## 2020-02-23 RX ADMIN — ACETAMINOPHEN 650 MG: 325 TABLET ORAL at 21:39

## 2020-02-23 RX ADMIN — ASPIRIN 81 MG: 81 TABLET ORAL at 09:03

## 2020-02-23 RX ADMIN — AZITHROMYCIN MONOHYDRATE 500 MG: 500 INJECTION, POWDER, LYOPHILIZED, FOR SOLUTION INTRAVENOUS at 23:42

## 2020-02-23 RX ADMIN — POTASSIUM CHLORIDE 40 MEQ: 20 TABLET, EXTENDED RELEASE ORAL at 07:47

## 2020-02-23 RX ADMIN — METOPROLOL TARTRATE 25 MG: 25 TABLET ORAL at 21:38

## 2020-02-23 NOTE — PROGRESS NOTES
Problem: Self Care Deficits Care Plan (Adult)  Goal: *Acute Goals and Plan of Care (Insert Text)  Description    FUNCTIONAL STATUS PRIOR TO ADMISSION: Patient was independent and active without use of DME.     HOME SUPPORT: The patient lived with family but did not require assist.    Occupational Therapy Goals  Initiated 2/23/2020  1. Patient will perform grooming with supervision/set-up within 7 day(s). 2.  Patient will perform self-feeding with supervision/set-up within 7 day(s). 3.  Patient will perform lower body dressing with minimal assistance/contact guard assist within 7 day(s). 4.  Patient will perform toilet transfers with moderate assistance  within 7 day(s). 5.  Patient will perform all aspects of toileting with minimal assistance/contact guard assist within 7 day(s). 6.  Patient will participate in upper extremity therapeutic exercise/activities with modified independence for 15 minutes within 7 day(s). 7.  Patient will utilize energy conservation techniques during functional activities with verbal cues within 7 day(s). Outcome: Progressing Towards Goal    OCCUPATIONAL THERAPY EVALUATION  Patient: Bandar Garcia (80 y.o. male)  Date: 2/23/2020  Primary Diagnosis: NSTEMI (non-ST elevated myocardial infarction) [facility]) [I21.4]        Precautions:   Fall, Skin, Bed Alarm(dementia)    ASSESSMENT  Based on the objective data described below, the patient presents with decreased functional mobility and independence in self care following fall at home. He complains of right hand pain but has improved since admission. Per the family the patient was independent at baseline but had family support. He presents today AAOx4, continues to need max A for mobility, and min A for bed level grooming and self-feeding. Decreased strength noted in R hand but using it today for functional activity.  Feel the patient would benefit from SNF placement at DC to progress in independence prior to dc home with family. Acute care OT will continue to follow. Current Level of Function Impacting Discharge (ADLs/self-care): max A to min A for LB and UB (seated) ADLs     Functional Outcome Measure: The patient scored Total: 20/100 on the Barthel Index outcome measure which is indicative of 80% impaired ability to care for basic self needs/dependency on others; inferred 80% dependency on others for instrumental ADLs. Other factors to consider for discharge: help at home     Patient will benefit from skilled therapy intervention to address the above noted impairments. PLAN :  Recommendations and Planned Interventions: self care training, functional mobility training, therapeutic exercise, therapeutic activities, endurance activities, patient education, home safety training, and family training/education    Frequency/Duration: Patient will be followed by occupational therapy 3 times a week to address goals. Recommendation for discharge: (in order for the patient to meet his/her long term goals)  Therapy up to 5 days/week in SNF setting    This discharge recommendation:  Has not yet been discussed the attending provider and/or case management    IF patient discharges home will need the following DME: TBD following further recovery       SUBJECTIVE:   Patient stated Emili Mckinney will need some help if you want me to do this (brush teeth).     OBJECTIVE DATA SUMMARY:   HISTORY:   Past Medical History:   Diagnosis Date    High blood pressure     High cholesterol     Prostate cancer ([facility] [address]    History reviewed. No pertinent surgical history.     Expanded or extensive additional review of patient history:     Home Situation  Home Environment: Private residence  # Steps to Enter: 4  Rails to Enter: Yes  Hand Rails : Bilateral  Wheelchair Ramp: No  One/Two Story Residence: Two story, live on 1st floor  # of Interior Steps: 12  Living Alone: Yes(family checks on patient daily)  Support Systems: Family member(s), Friends \ neighbors  Patient Expects to be Discharged to[de-identified] Unknown  Current DME Used/Available at Home: [place], straight, Walker, rolling(furniture/walls)  Tub or Shower Type: Shower    Hand dominance: Right    EXAMINATION OF PERFORMANCE DEFICITS:  Cognitive/Behavioral Status:  Neurologic State: Alert  Orientation Level: Oriented X4  Cognition: Memory loss; Follows commands  Perception: Appears intact  Perseveration: No perseveration noted  Safety/Judgement: Awareness of environment    Skin: noted bruises on right UE    Edema: none noted    Hearing: Auditory  Auditory Impairment: None      Range of Motion:    AROM: Generally decreased, functional  PROM: Generally decreased, functional                      Strength:    Strength: Generally decreased, functional                Coordination:  Coordination: Generally decreased, functional  Fine Motor Skills-Upper: Left Intact; Right Intact    Gross Motor Skills-Upper: Left Intact; Right Intact    Tone & Sensation:    Tone: Normal  Sensation: Intact                        Functional Mobility and Transfers for ADLs:  Bed Mobility:  Rolling: Moderate assistance      ADL Assessment:  Feeding: Minimum assistance    Oral Facial Hygiene/Grooming: Minimum assistance    Bathing: Maximum assistance    Upper Body Dressing: Minimum assistance    Lower Body Dressing: Maximum assistance    Toileting: Total assistance                ADL Intervention and task modifications:   Supine in bed with HOB elevated pt participated in grooming activity with cues and min A to help support RUE as well as help with holding basin and cues for washing total face. Decreased stamina noted. Education provided for role of OT as well as answered of families questions regarding further therapy.      Education provided on importance of oral care for lung disease prevention     Cognitive Retraining  Safety/Judgement: Awareness of environment    Therapeutic Exercise:  R Hand: open and close 10 times, pt completed without assistance and no report in increased pain     Functional Measure:  Barthel Index:    Bathin  Bladder: 5  Bowels: 10  Groomin  Dressin  Feedin  Mobility: 0  Stairs: 0  Toilet Use: 0  Transfer (Bed to Chair and Back): 5  Total: 20/100        The Barthel ADL Index: Guidelines  1. The index should be used as a record of what a patient does, not as a record of what a patient could do. 2. The main aim is to establish degree of independence from any help, physical or verbal, however minor and for whatever reason. 3. The need for supervision renders the patient not independent. 4. A patient's performance should be established using the best available evidence. Asking the patient, friends/relatives and nurses are the usual sources, but direct observation and common sense are also important. However direct testing is not needed. 5. Usually the patient's performance over the preceding 24-48 hours is important, but occasionally longer periods will be relevant. 6. Middle categories imply that the patient supplies over 50 per cent of the effort. 7. Use of aids to be independent is allowed. Link ., Barthel, D.W. (7016). Functional evaluation: the Barthel Index. [facility] (14)2. JULIAN Garrett, Armando Russell., Luiza Carpenter., [place], [facility] (). Measuring the change indisability after inpatient rehabilitation; comparison of the responsiveness of the Barthel Index and Functional [place] Measure. Journal of Neurology, Neurosurgery, and Psychiatry, 66(4), 671-565. Amanda Christianson, N.J.A, Evelyne Clark  WOanhJ.LOVELY, & Tri Gunn M.A. (2004.) Assessment of post-stroke quality of life in cost-effectiveness studies: The usefulness of the Barthel Index and the EuroQoL-5D.  Quality of Life Research, 15, 358-99         Occupational Therapy Evaluation Charge Determination   History Examination Decision-Making   MEDIUM Complexity : Expanded review of history including physical, cognitive and psychosocial  history  HIGH Complexity : 5 or more performance deficits relating to physical, cognitive , or psychosocial skils that result in activity limitations and / or participation restrictions HIGH Complexity : Patient presents with comorbidities that affect occupational performance. Signifigant modification of tasks or assistance (eg, physical or verbal) with assessment (s) is necessary to enable patient to complete evaluation       Based on the above components, the patient evaluation is determined to be of the following complexity level: MEDIUM  Pain Rating:  Not rated     Activity Tolerance:   Fair  Please refer to the flowsheet for vital signs taken during this treatment. After treatment patient left in no apparent distress:    Supine in bed, Heels elevated for pressure relief, Call bell within reach, and Caregiver / family present    COMMUNICATION/EDUCATION:   The patients plan of care was discussed with: Registered Nurse. Home safety education was provided and the patient/caregiver indicated understanding., Patient/family have participated as able in goal setting and plan of care. , and Patient/family agree to work toward stated goals and plan of care. This patients plan of care is appropriate for delegation to [facility].     Thank you for this referral.  Katja Piña  Time Calculation: 40 mins

## 2020-02-23 NOTE — PROGRESS NOTES
0715 : Report received from [facility]. SBAR, Kardex, Intake/Output, MAR and Recent Results were discussed. Ben Huggins RN     At time of report, 1:1 sitter d/c. Patient had not attempted to get up at all overnight and is alert and oriented x4.    1111 : Dr. Juanis coronado, orders to transfer patient to telemetry. 1133 : Family at bedside, concerned about 2 blisters that have popped on back of right thigh. Asked to reconsult wound care on Monday to assess. 1521 : Report given to Miguel May RN. SBAR, Kardex, Intake/Output, MAR or Recent Results were discussed. Miguel May RN assumed care of the pt.     Ben Huggins RN

## 2020-02-23 NOTE — PROGRESS NOTES
Bedside and Verbal shift change report given to [address] [place] [address] (oncoming nurse) by Polina Bacon (offgoing nurse). Report included the following information SBAR, Kardex, Intake/Output, MAR, Recent Results and Cardiac Rhythm sinus. 1915: Received report and was notified that pt's daughter was upset about the pt not having a sitter tonight. 1920:  Multiple nurses and Jayne Ribeiro supervisor have tried to inform family that the patient does not have a sitter order now and that he does not need one based on his current improved condition. Per Dr. Bam Daniel the plan is to discharge Monday, but if he has a sitter that would delay discharge. Pt's daughter still adamant about sitter after explanation. Pt requested to speak to the MD.    1930: Nurse paged teleMD about situation. Dr Jessica Thomason returned call. Per Dr. Lopez Lab note, \"Notified by nursing that Pt's daughter is demanding a sitter tonight, despite nursing education that this will delay discharge. D/w nursing.  - will replace sitter tonight due to family concerns, primary team that knows Pt/family will need to address in AM.\"    2000: Informed PM nursing supervisor. Supervisor refused sitter. Pt's daughter becoming increasingly upset about situation and demanding to speak with supervisor. 2015: RN tried to appease situation by letting family know that we will closely monitor patient overnight and make adjustments as needed. No communication between PM nursing supervisor and pt's daughter, which further upset the family. Pt's daughter said, \" I will email my complaints. \"  She left for the night. Pt resting quietly. VSS. Will continue to monitor. 5178: Critical lab. Calcium <5. Notified tele MD.   4562: Pt calm and resting quietly. VSS. no c/o of pain. Report given to [facility].

## 2020-02-23 NOTE — PROGRESS NOTES
The patient's daughter stated that she was about to leave and that she was told that she would be provided a sitter. I advised the patient's daughter that there was not a formal order for the sitter and that since the patient was calm overnight and during the day, that the patient was most likely not going to qualify for a sitter. The patient's daughter was adamant that she receive a sitter. I sent Dr Juanis Henao a msg via [employer] apprising him of the events and he stated that the patient would have a hindered discharge on Monday. Ahmet Zuniga, Nursing supervisor was called and spoke to the patient's daughter informing her of the sitter protocol. These events were also relayed to the oncoming nurse, Jagdish Morales. Jagdish Morales was instructed to contACT [address] [place] [address] so he could let the family know that their discharge would be delayed potentially if they received a sitter.

## 2020-02-23 NOTE — PROGRESS NOTES
Hospitalist Progress Note    NAME: Jaye Esparza. :  5/10/1929   MRN:  [id number]       Assessment / Plan:  NSTEMI: As per Cardiology medical management, c/w ASA, Plavix, BB, avoid statin for now due to Rhabdomyolysis. Cardiology in the case. D/vy Heparin drip  Acute Rhabdomyolysis: c/w IVF, D/C  low dose Bicarbonate,  Nephrology help appreciated, CK trending down to 1200 today  RLL Pneumonia: c/w CTX/Z-max, bronchodilators, mucolytics, F/U sputum. HTN : increased  BB, monitor, so far good control  Hypercholesteremia  D/c Statin    Hypokalemia: replace and monitor  Hypocalcemia: replace and monitor. Hypomagnesemia: replace and monitor  S/p Fall: multiple bruises in legs, get wound care. Dementia/Cerebral Atrophy: noted in CT,  at times, monitor, no need for sitter, if patient becomes increasingly confused at night may need low dose seroquel at night, but will try to delay this until is absolutely necessary  Surrogate Decision Maker:Priyanka Veronica  [phone]  Code status: Full  Prophylaxis: lovenox  Recommended Disposition: SNF/LTC     D/c plan for tomorrow to SNF if CM has make proper arrangements     Subjective:     Chief Complaint / Reason for Physician Visit  \"I feel OK\". Discussed with RN events overnight. Review of Systems:  Symptom Y/N Comments  Symptom Y/N Comments   Fever/Chills    Chest Pain     Poor Appetite    Edema     Cough y   Abdominal Pain     Sputum    Joint Pain y    SOB/PRADHAN n   Pruritis/Rash     Nausea/vomit    Tolerating PT/OT     Diarrhea    Tolerating Diet y    Constipation    Other       Could NOT obtain due to:      Objective:     VITALS:   Last 24hrs VS reviewed since prior progress note.  Most recent are:  Patient Vitals for the past 24 hrs:   Temp Pulse Resp BP SpO2   20 1108 98.7 °F (37.1 °C) 61 18 143/73 100 %   20 0716 98.3 °F (36.8 °C) 73 18 137/63 95 %   20 0343 98 °F (36.7 °C) 64 18 123/57 95 %   20 2256 97.8 °F (36.6 °C) (!) 56 18 111/62 97 %   02/22/20 2059 98.3 °F (36.8 °C) 64 18 124/59 98 %   02/22/20 2030    124/59    02/22/20 1610 98.4 °F (36.9 °C) 62 18 130/61 97 %   02/22/20 1153 98.7 °F (37.1 °C) (!) 57 16 115/55 95 %       Intake/Output Summary (Last 24 hours) at 2/23/2020 1120  Last data filed at 2/23/2020 2114  Gross per 24 hour   Intake 360 ml   Output    Net 360 ml        PHYSICAL EXAM:  General: WD, WN. Alert, cooperative, no acute distress    EENT:  EOMI. Anicteric sclerae. MMM  Resp:  Coarse BS, rhonchi  CV:  Regular  rhythm,  No edema  GI:  Soft, Non distended, Non tender.  +Bowel sounds  Neurologic:  Alert and oriented X 2, normal speech,   Psych:   Fair  insight. Not anxious nor agitated  Skin:  No rashes. No jaundice    Reviewed most current lab test results and cultures  YES  Reviewed most current radiology test results   YES  Review and summation of old records today    NO  Reviewed patient's current orders and MAR    YES  PMH/ reviewed - no change compared to H&P  ________________________________________________________________________  Care Plan discussed with:    Comments   Patient y    Family  y Priyanka ACOSTA y    Care Manager     Consultant                        Multidiciplinary team rounds were held today with , nursing, pharmacist and clinical coordinator. Patient's plan of care was discussed; medications were reviewed and discharge planning was addressed. ________________________________________________________________________  Total NON critical care TIME: 30 Minutes    Total CRITICAL CARE TIME Spent:   Minutes non procedure based      Comments   >50% of visit spent in counseling and coordination of care y    ________________________________________________________________________  Warden Timi MD     Procedures: see electronic medical records for all procedures/Xrays and details which were not copied into this note but were reviewed prior to creation of Plan.       LABS:  I reviewed today's most current labs and imaging studies.   Pertinent labs include:  Recent Labs     02/23/20  0515 02/22/20  0249 02/21/20  0541   WBC 8.1 12.1* 12.9*   HGB 8.1* 9.7* 11.2*   HCT 23.8* 28.3* 33.1*    275 296     Recent Labs     02/23/20  0515 02/22/20  0249 02/21/20  0541    136 137   K 2.8* 3.9 3.4*   * 106 104   CO2 17* 25 27   GLU 72 115* 114*   BUN 19 30* 30*   CREA 0.45* 0.86 0.94   CA <5.0* 7.7* 8.0*   MG 1.6 2.4 2.5*   ALB 1.2* 2.2* 2.5*   TBILI 0.2 0.3 0.4   SGOT 102* 268* 383*   ALT 44 80* 87*       Signed: Davi Hermosillo MD

## 2020-02-23 NOTE — PROGRESS NOTES
Notified by nursing that Pt's daughter is demanding a sitter tonight, despite nursing education that this will delay discharge.  D/w nursing.  - will replace sitter tonight due to family concerns, primary team that knows Pt/family will need to address in AM

## 2020-02-23 NOTE — PROGRESS NOTES
Bedside and Verbal shift change report given to [address]) by Jearlean Scheuermann, RN (offgoing nurse).  Report included the following information SBAR, Kardex, ED Summary, MAR, Recent Results, Med Rec Status and Cardiac Rhythm.

## 2020-02-24 LAB
ALBUMIN SERPL-MCNC: 2.2 G/DL (ref 3.5–5)
ALBUMIN/GLOB SERPL: 0.6 {RATIO} (ref 1.1–2.2)
ALP SERPL-CCNC: 65 U/L (ref 45–117)
ALT SERPL-CCNC: 69 U/L (ref 12–78)
ANION GAP SERPL CALC-SCNC: 6 MMOL/L (ref 5–15)
AST SERPL-CCNC: 127 U/L (ref 15–37)
BASOPHILS # BLD: 0.1 K/UL (ref 0–0.1)
BASOPHILS NFR BLD: 1 % (ref 0–1)
BILIRUB SERPL-MCNC: 0.5 MG/DL (ref 0.2–1)
BUN SERPL-MCNC: 24 MG/DL (ref 6–20)
BUN/CREAT SERPL: 26 (ref 12–20)
CALCIUM SERPL-MCNC: 8.4 MG/DL (ref 8.5–10.1)
CHLORIDE SERPL-SCNC: 105 MMOL/L (ref 97–108)
CK SERPL-CCNC: 1453 U/L (ref 39–308)
CO2 SERPL-SCNC: 23 MMOL/L (ref 21–32)
CREAT SERPL-MCNC: 0.91 MG/DL (ref 0.7–1.3)
DIFFERENTIAL METHOD BLD: ABNORMAL
EOSINOPHIL # BLD: 0.3 K/UL (ref 0–0.4)
EOSINOPHIL NFR BLD: 3 % (ref 0–7)
ERYTHROCYTE [DISTWIDTH] IN BLOOD BY AUTOMATED COUNT: 14.7 % (ref 11.5–14.5)
GLOBULIN SER CALC-MCNC: 3.6 G/DL (ref 2–4)
GLUCOSE SERPL-MCNC: 116 MG/DL (ref 65–100)
HCT VFR BLD AUTO: 28 % (ref 36.6–50.3)
HGB BLD-MCNC: 9.4 G/DL (ref 12.1–17)
IMM GRANULOCYTES # BLD AUTO: 0 K/UL (ref 0–0.04)
IMM GRANULOCYTES NFR BLD AUTO: 0 % (ref 0–0.5)
LYMPHOCYTES # BLD: 1 K/UL (ref 0.8–3.5)
LYMPHOCYTES NFR BLD: 10 % (ref 12–49)
MAGNESIUM SERPL-MCNC: 2.8 MG/DL (ref 1.6–2.4)
MCH RBC QN AUTO: 30.2 PG (ref 26–34)
MCHC RBC AUTO-ENTMCNC: 33.6 G/DL (ref 30–36.5)
MCV RBC AUTO: 90 FL (ref 80–99)
MONOCYTES # BLD: 0.8 K/UL (ref 0–1)
MONOCYTES NFR BLD: 9 % (ref 5–13)
NEUTS SEG # BLD: 7.3 K/UL (ref 1.8–8)
NEUTS SEG NFR BLD: 77 % (ref 32–75)
NRBC # BLD: 0 K/UL (ref 0–0.01)
NRBC BLD-RTO: 0 PER 100 WBC
PHOSPHATE SERPL-MCNC: 2.7 MG/DL (ref 2.6–4.7)
PLATELET # BLD AUTO: 329 K/UL (ref 150–400)
PMV BLD AUTO: 9.7 FL (ref 8.9–12.9)
POTASSIUM SERPL-SCNC: 4.8 MMOL/L (ref 3.5–5.1)
PROT SERPL-MCNC: 5.8 G/DL (ref 6.4–8.2)
RBC # BLD AUTO: 3.11 M/UL (ref 4.1–5.7)
SODIUM SERPL-SCNC: 134 MMOL/L (ref 136–145)
WBC # BLD AUTO: 9.5 K/UL (ref 4.1–11.1)

## 2020-02-24 PROCEDURE — 94760 N-INVAS EAR/PLS OXIMETRY 1: CPT

## 2020-02-24 PROCEDURE — 74011250636 HC RX REV CODE- 250/636: Performed by: INTERNAL MEDICINE

## 2020-02-24 PROCEDURE — 83735 ASSAY OF MAGNESIUM: CPT

## 2020-02-24 PROCEDURE — 65660000001 HC RM ICU INTERMED STEPDOWN

## 2020-02-24 PROCEDURE — 74011250637 HC RX REV CODE- 250/637: Performed by: INTERNAL MEDICINE

## 2020-02-24 PROCEDURE — 74011250636 HC RX REV CODE- 250/636: Performed by: EMERGENCY MEDICINE

## 2020-02-24 PROCEDURE — 97116 GAIT TRAINING THERAPY: CPT

## 2020-02-24 PROCEDURE — 97530 THERAPEUTIC ACTIVITIES: CPT

## 2020-02-24 PROCEDURE — 74011250637 HC RX REV CODE- 250/637: Performed by: NURSE PRACTITIONER

## 2020-02-24 PROCEDURE — 82550 ASSAY OF CK (CPK): CPT

## 2020-02-24 PROCEDURE — 36415 COLL VENOUS BLD VENIPUNCTURE: CPT

## 2020-02-24 PROCEDURE — 84100 ASSAY OF PHOSPHORUS: CPT

## 2020-02-24 PROCEDURE — 80053 COMPREHEN METABOLIC PANEL: CPT

## 2020-02-24 PROCEDURE — 85025 COMPLETE CBC W/AUTO DIFF WBC: CPT

## 2020-02-24 PROCEDURE — 74011000258 HC RX REV CODE- 258: Performed by: EMERGENCY MEDICINE

## 2020-02-24 RX ORDER — BALSAM PERU/CASTOR OIL
OINTMENT (GRAM) TOPICAL 2 TIMES DAILY
Status: DISCONTINUED | OUTPATIENT
Start: 2020-02-24 | End: 2020-02-25 | Stop reason: HOSPADM

## 2020-02-24 RX ORDER — QUETIAPINE FUMARATE 25 MG/1
25 TABLET, FILM COATED ORAL
Status: DISCONTINUED | OUTPATIENT
Start: 2020-02-24 | End: 2020-02-25 | Stop reason: HOSPADM

## 2020-02-24 RX ORDER — SODIUM CHLORIDE 9 MG/ML
75 INJECTION, SOLUTION INTRAVENOUS CONTINUOUS
Status: DISCONTINUED | OUTPATIENT
Start: 2020-02-24 | End: 2020-02-25 | Stop reason: HOSPADM

## 2020-02-24 RX ADMIN — Medication 10 ML: at 18:44

## 2020-02-24 RX ADMIN — QUETIAPINE FUMARATE 25 MG: 25 TABLET ORAL at 18:42

## 2020-02-24 RX ADMIN — ACETAMINOPHEN 650 MG: 325 TABLET ORAL at 03:29

## 2020-02-24 RX ADMIN — METOPROLOL TARTRATE 25 MG: 25 TABLET ORAL at 08:42

## 2020-02-24 RX ADMIN — Medication 10 ML: at 23:26

## 2020-02-24 RX ADMIN — AMLODIPINE BESYLATE 2.5 MG: 5 TABLET ORAL at 08:43

## 2020-02-24 RX ADMIN — CEFTRIAXONE SODIUM 2 G: 2 INJECTION, POWDER, FOR SOLUTION INTRAMUSCULAR; INTRAVENOUS at 23:26

## 2020-02-24 RX ADMIN — ASPIRIN 81 MG: 81 TABLET ORAL at 08:44

## 2020-02-24 RX ADMIN — Medication 10 ML: at 06:45

## 2020-02-24 RX ADMIN — CASTOR OIL AND BALSAM, PERU: 788; 87 OINTMENT TOPICAL at 18:45

## 2020-02-24 RX ADMIN — ACETAMINOPHEN 650 MG: 325 TABLET ORAL at 18:42

## 2020-02-24 RX ADMIN — ACETAMINOPHEN 650 MG: 325 TABLET ORAL at 12:18

## 2020-02-24 RX ADMIN — SODIUM CHLORIDE 75 ML/HR: 900 INJECTION, SOLUTION INTRAVENOUS at 12:19

## 2020-02-24 RX ADMIN — ENOXAPARIN SODIUM 40 MG: 40 INJECTION SUBCUTANEOUS at 12:17

## 2020-02-24 RX ADMIN — CLOPIDOGREL BISULFATE 75 MG: 75 TABLET ORAL at 08:44

## 2020-02-24 RX ADMIN — METOPROLOL TARTRATE 25 MG: 25 TABLET ORAL at 23:25

## 2020-02-24 NOTE — PROGRESS NOTES
RAJAN: Danish Levine    4:45pm- CM met with pt and pt's daughter's at bedside. CM informed pt's daughter's that pt was accepted to [facility] and that insurance has approved him to go to [facility]. CM informed pt's daughter's that CM will schedule medical transport. CM discuss with pt's daughter about pt's Medicare rights and their right to appeal the discharge. Pt's daughter understood pt's Medicare rights. Pt unable to sign. Pt's daughter signed IM Letter. Copy of 2nd IM letter was placed in pt bedside chart. 4:25pm- [facility]. 1:30pm- CM faxed clinical documentation to [facility] to start insurance auth. 10:45am- CM called pt's daughter Amairani Crain, [phone]) to follow-up with SNF preference. Pt's daughter stated that she selected [facility] for first choice and [facility] for second choice. The Plan for Transition of Care is related to the following treatment goals: SNF    The Patient and/or patient representative Amairani Crain was provided with a choice of provider and agrees   with the discharge plan. [x] Yes [] No    Freedom of choice list was provided with basic dialogue that supports the patient's individualized plan of care/goals, treatment preferences and shares the quality data associated with the providers. [x] Yes [] No    Pt's daughter provided verbal consent for [address] document. [address] document placed in pt's bedside chart. Referrals sent to SNF via [facility]. CM will continue to follow patient for discharge planning needs and arrange for services as deemed necessary.     Saeed Restrepo [address] [place] [address]  [phone]

## 2020-02-24 NOTE — PROGRESS NOTES
Bedside and Verbal shift change report GIVEN TO Aaliyah Dutta RN. Report included the following information SBAR, Kardex, ED Summary, Procedure Summary, Intake/Output, MAR and Recent Results.      Unable to educate pt d/t confusion

## 2020-02-24 NOTE — PROGRESS NOTES
Received report from Meseret, [facility]. Assumed care of patient.

## 2020-02-24 NOTE — PROGRESS NOTES
Patient pulled out IV x2 from left arm. Patient c/o pain, site has redness and swelling. Cool compress applied. 0330- Patient stated that pain has subsided. Slight swelling noted, redness above catheter site.

## 2020-02-24 NOTE — WOUND CARE
Wound Care Consult: Chart reviewed and patient reassessed for his right leg wounds / blisters that were present on admission. I saw this patient on 2- initially when he was being ruled out for NSTEMI vs. Rhabdo (or both). Patient was found down in his bathroom at home with his right leg underneath him. He CK continues to trend up a long with his Troponin. Pt. Did not want to have a heart catherization. He will be going to rehab tomorrow per his daughter. Reassessment: See photos below (from the first day and today). The wounds were light pink and open / draining blisters as well as some intact blisters that had to reabsorb or pop on their own. The second set of photos are from the same wounds but they are now healing and have more circulation in the skin / wound tissue which is making them brighter pink and some granulation tissue. Patient will now need daily wound care and he will greatly benefit from Venelex ointment. For now, the wounds are open and draining and will need gauze dressings to keep them moist. In about 7-10 days the wounds can slowly (one at a time) stay open to air to heal with scabbing but until then he needs moist wound care to establish more healing.  
 
2- upon admission:  
   
 
2- Reassessment:  
 
 
 
 
Venelex ordered for the wounds on the right leg and posterior thigh as well as an area on his mid lower back. Discussed with RN, Brian Medina. Nursing to do the wound care once the ointment gets to the unit from pharmacy this evening.   
Precious Bustamante RN, BSN, [employer]

## 2020-02-24 NOTE — PROGRESS NOTES
Problem: Pressure Injury - Risk of  Goal: *Prevention of pressure injury  Description  Document Artem Scale and appropriate interventions in the flowsheet. Outcome: Progressing Towards Goal  Note: Pressure Injury Interventions:  Sensory Interventions: Assess changes in LOC    Moisture Interventions: Absorbent underpads, Apply protective barrier, creams and emollients, Check for incontinence Q2 hours and as needed, Internal/External urinary devices    Activity Interventions: Increase time out of bed, PT/OT evaluation    Mobility Interventions: Pressure redistribution bed/mattress (bed type), PT/OT evaluation    Nutrition Interventions: Document food/fluid/supplement intake    Friction and Shear Interventions: Lift team/patient mobility team, Minimize layers                Problem: Patient Education: Go to Patient Education Activity  Goal: Patient/Family Education  Outcome: Progressing Towards Goal     Problem: Falls - Risk of  Goal: *Absence of Falls  Description  Document Renny Frias Fall Risk and appropriate interventions in the flowsheet.   Outcome: Progressing Towards Goal  Note: Fall Risk Interventions:       Mentation Interventions: Adequate sleep, hydration, pain control, Bed/chair exit alarm    Medication Interventions: Bed/chair exit alarm    Elimination Interventions: Bed/chair exit alarm, Call light in reach    History of Falls Interventions: Bed/chair exit alarm         Problem: Patient Education: Go to Patient Education Activity  Goal: Patient/Family Education  Outcome: Progressing Towards Goal

## 2020-02-24 NOTE — PROGRESS NOTES
Hospitalist Progress Note    NAME: Steven Bansal. :  5/10/1929   MRN:  [id number]       Assessment / Plan:  NSTEMI: As per Cardiology medical management, c/w ASA, Plavix, BB, avoid statin for now due to Rhabdomyolysis. Cardiology in the case. D/vy Heparin drip, so far stable  Acute Rhabdomyolysis: c/w IVF, D/C  low dose Bicarbonate,  Nephrology help appreciated, CK trending up to 1400, c/w IVF and monitor  RLL Pneumonia: c/w CTX, completed Z-max, bronchodilators, mucolytics, F/U sputum. HTN : increased  BB, monitor, so far good control  Hypercholesteremia  D/c Statin    Hypokalemia: replace and monitor  Hypocalcemia: replace and monitor. Hypomagnesemia: replace and monitor  S/p Fall: multiple bruises in legs, get wound care. Dementia/Cerebral Atrophy: noted in CT,  at times, monitor, no need for sitter, if patient becomes increasingly confused at night may need low dose seroquel at night, but will try to delay this until is absolutely necessary, will add low dose Seroquel at night  Surrogate Decision Maker:Priyanka Veronica  [phone]  Code status: Full  Prophylaxis: lovenox  Recommended Disposition: SNF/LTC     D/c plan for tomorrow to SNF     Subjective:     Chief Complaint / Reason for Physician Visit  \"I feel good\". Discussed with RN events overnight. Review of Systems:  Symptom Y/N Comments  Symptom Y/N Comments   Fever/Chills    Chest Pain     Poor Appetite    Edema     Cough y   Abdominal Pain     Sputum    Joint Pain y    SOB/PRADHAN n   Pruritis/Rash     Nausea/vomit    Tolerating PT/OT     Diarrhea    Tolerating Diet y    Constipation    Other       Could NOT obtain due to:      Objective:     VITALS:   Last 24hrs VS reviewed since prior progress note.  Most recent are:  Patient Vitals for the past 24 hrs:   Temp Pulse Resp BP SpO2   20 1037 97.7 °F (36.5 °C) (!) 57 20 135/59 99 %   20 0736 97.3 °F (36.3 °C) 64 20 158/83 97 %   20 0319 98.2 °F (36.8 °C) 72 20 153/73 95 %   02/23/20 2240 99.5 °F (37.5 °C) 68 20 148/69 98 %   02/23/20 1912 98.5 °F (36.9 °C) 66 20 149/69 96 %   02/23/20 1804 98.2 °F (36.8 °C) 70 16 149/66 97 %   02/23/20 1549 98.8 °F (37.1 °C) 64 18 150/70 99 %       Intake/Output Summary (Last 24 hours) at 2/24/2020 1146  Last data filed at 2/23/2020 2300  Gross per 24 hour   Intake 1890 ml   Output    Net 1890 ml        PHYSICAL EXAM:  General: WD, WN. Alert, cooperative, no acute distress    EENT:  EOMI. Anicteric sclerae. MMM  Resp:  Coarse BS, rhonchi  CV:  Regular  rhythm,  No edema  GI:  Soft, Non distended, Non tender.  +Bowel sounds  Neurologic:  Alert and oriented X 2, normal speech,   Psych:   Fair  insight. Not anxious nor agitated  Skin:  No rashes. No jaundice    Reviewed most current lab test results and cultures  YES  Reviewed most current radiology test results   YES  Review and summation of old records today    NO  Reviewed patient's current orders and MAR    YES  PMH/SH reviewed - no change compared to H&P  ________________________________________________________________________  Care Plan discussed with:    Comments   Patient y    Family  y Priyanka ACOSTA y    Care Manager     Consultant                        Multidiciplinary team rounds were held today with , nursing, pharmacist and clinical coordinator. Patient's plan of care was discussed; medications were reviewed and discharge planning was addressed. ________________________________________________________________________  Total NON critical care TIME: 25 Minutes    Total CRITICAL CARE TIME Spent:   Minutes non procedure based      Comments   >50% of visit spent in counseling and coordination of care y    ________________________________________________________________________  Jing Valadez MD     Procedures: see electronic medical records for all procedures/Xrays and details which were not copied into this note but were reviewed prior to creation of Plan.       LABS:  I reviewed today's most current labs and imaging studies.   Pertinent labs include:  Recent Labs     02/24/20 0318 02/23/20  0515 02/22/20  0249   WBC 9.5 8.1 12.1*   HGB 9.4* 8.1* 9.7*   HCT 28.0* 23.8* 28.3*    249 275     Recent Labs     02/24/20 0318 02/23/20  1405 02/23/20 0515 02/22/20  0249   * 134* 145 136   K 4.8 5.3* 2.8* 3.9    106 121* 106   CO2 23 24 17* 25   * 115* 72 115*   BUN 24* 25* 19 30*   CREA 0.91 0.88 0.45* 0.86   CA 8.4* 8.7 <5.0* 7.7*   MG 2.8*  --  1.6 2.4   ALB 2.2* 2.3* 1.2* 2.2*   TBILI 0.5 0.3 0.2 0.3   SGOT 127* 162* 102* 268*   ALT 69 76 44 80*       Signed: Yelitza French MD

## 2020-02-24 NOTE — PROGRESS NOTES
Bedside shift change report GIVEN TO Enma Garcia RN. Report included the following information SBAR. SIGNIFICANT CHANGES DURING SHIFT:  Condom catheter placed, and changed x3. Dual skin performed with Enma Garcia RN. CONCERNS TO ADDRESS WITH MD:            [facility] NURSING NOTE   Admission Date 2/19/2020   Admission Diagnosis NSTEMI (non-ST elevated myocardial infarction) ([facility] [address] [I21.4]   Consults IP CONSULT TO CARDIOLOGY  IP CONSULT TO NEPHROLOGY      Cardiac Monitoring [x] Yes [] No      Purposeful Hourly Rounding [x] Yes    Debbie Score Total Score: 4   Debbie score 3 or > [x] Bed Alarm [] Avasys [] 1:1 sitter [] Patient refused (Signed refusal form in chart)   Artem Score Artem Score: 11   Artem score 14 or < [] PMT consult [x] Wound Care consult    []  Specialty bed  [] Nutrition consult      Influenza Vaccine Received Flu Vaccine for Current Season (usually Sept-March): Yes           Oxygen needs? [x] Room air Oxygen @  []1L    []2L    []3L   []4L    []5L   []6L via  NC   Chronic home O2 use?  [] Yes [x] No  Perform O2 challenge test and document in progress note using smartphrase (.Homeoxygen)      Last bowel movement Last Bowel Movement Date: 02/23/20      Urinary Catheter Condom Catheter 02/23/20-Indications for Use: Accurate measurement of urinary output           LDAs               Peripheral IV 02/21/20 Right Antecubital (Active)   Site Assessment Clean, dry, & intact 2/24/2020  7:39 AM   Phlebitis Assessment 0 2/24/2020  3:29 AM   Infiltration Assessment 0 2/24/2020  3:29 AM   Dressing Status Clean, dry, & intact 2/24/2020  3:29 AM   Dressing Type Tape;Transparent 2/24/2020  3:29 AM   Hub Color/Line Status Pink;Capped 2/24/2020  3:29 AM   Alcohol Cap Used Yes 2/24/2020  3:29 AM       Peripheral IV 02/21/20 Anterior;Left;Proximal Forearm (Active)   Site Assessment Clean, dry, & intact 2/23/2020  8:00 PM   Phlebitis Assessment 0 2/23/2020  8:00 PM   Infiltration Assessment 0 2/23/2020  8:00 PM Dressing Status Clean, dry, & intact 2/23/2020  8:00 PM   Dressing Type Tape;Transparent 2/23/2020  8:00 PM   Hub Color/Line Status Pink;Capped 2/23/2020  8:00 PM   Alcohol Cap Used Yes 2/23/2020  8:00 PM       Peripheral IV 02/21/20 Anterior;Distal;Left Forearm (Active)   Site Assessment Clean, dry, & intact 2/23/2020  8:00 PM   Phlebitis Assessment 0 2/23/2020  8:00 PM   Infiltration Assessment 0 2/23/2020  8:00 PM   Dressing Status Clean, dry, & intact 2/23/2020  8:00 PM   Dressing Type Tape;Transparent 2/23/2020  8:00 PM   Hub Color/Line Status Pink;Capped 2/23/2020  8:00 PM   Alcohol Cap Used Yes 2/23/2020  8:00 PM          Condom Catheter 02/23/20 (Active)   Indications for Use Accurate measurement of urinary output 2/23/2020 11:00 PM   Status Draining 2/23/2020 11:00 PM   Site Condition Red/Inflamed 2/23/2020 11:00 PM   Drainage Tube Clipped to Bed Yes 2/23/2020 11:00 PM   Tamper Seal Intact Yes 2/23/2020 11:00 PM   Bag Below Bladder/Not on Floor Yes 2/23/2020 11:00 PM   Lack of Dependent Loop in Tubing Yes 2/23/2020 11:00 PM   Drainage Bag Less Than Half Full Yes 2/23/2020 11:00 PM                   Readmission Risk Assessment Tool Score Low Risk            11       Total Score        5 Pt. Coverage (Medicare=5 , Medicaid, or Self-Pay=4)    6 Charlson Comorbidity Score (Age + Comorbid Conditions)        Criteria that do not apply:    Has Seen PCP in Last 6 Months (Yes=3, No=0)    . Living with Significant Other. Assisted Living. LTAC. SNF.  or   Rehab    Patient Length of Stay (>5 days = 3)    IP Visits Last 12 Months (1-3=4, 4=9, >4=11)       Expected Length of Stay 4d 4h   Actual Length of Stay 5

## 2020-02-24 NOTE — PROGRESS NOTES
Problem: Mobility Impaired (Adult and Pediatric)  Goal: *Acute Goals and Plan of Care (Insert Text)  Description  FUNCTIONAL STATUS PRIOR TO ADMISSION: Patient was modified independent using a single point cane and furniture for functional mobility. Needed supervision for bathing. Was able to dress himself. HOME SUPPORT PRIOR TO ADMISSION: The patient lived alone with family/friends to provide assistance as needed. Physical Therapy Goals  Initiated 2/22/2020  1. Patient will move from supine to sit and sit to supine , scoot up and down and roll side to side in bed with standby assistance/set-up within 7 day(s). 2.  Patient will transfer from bed to chair and chair to bed with contact guard assist using the least restrictive device within 7 day(s). 3.  Patient will perform sit to stand with minimal assistance/contact guard assist within 7 day(s). 4.  Patient will ambulate with contact guard assist for 60 feet with the least restrictive device within 7 day(s). 5.  Patient will ascend/descend 4 stairs with 1 handrail(s) with minimal assistance/contact guard assist within 7 day(s). Outcome: Progressing Towards Goal   PHYSICAL THERAPY TREATMENT  Patient: Chacorta Vanegas (80 y.o. male)  Date: 2/24/2020  Diagnosis: NSTEMI (non-ST elevated [facility] [I21.4]   <principal problem not specified>       Precautions: Fall, Skin, Bed Alarm(dementia)  Chart, physical therapy assessment, plan of care and goals were reviewed. ASSESSMENT  Patient continues with skilled PT services and is progressing towards goals. Improved strength, decreased R hand/wrist pain, less confused, improved balance and mobility skills. Transferred supine to sit with sba, sit to stand needed min a x 2. He was able to progress to gait training today for 20 and 15 feet with RW. Needed min/cg assistance x 2. Gait was slow, small step lengths and intermittent path deviations.  He needed cues to correctly use walker and back all the way to toilet and recliner then reach back rather than holding walker. Left in recliner with family and activated alarm pad on seat with all needs met when the session ended. Current Level of Function Impacting Discharge (mobility/balance): min a x 2    Other factors to consider for discharge: dementia, lived alone, but family/friends check on him frequently         PLAN :  Patient continues to benefit from skilled intervention to address the above impairments. Continue treatment per established plan of care. to address goals. Recommendation for discharge: (in order for the patient to meet his/her long term goals)  Therapy up to 5 days/week in SNF setting    This discharge recommendation:  Has been made in collaboration with the attending provider and/or case management    IF patient discharges home will need the following DME: rolling walker       SUBJECTIVE:   Patient stated How long do I need to stay in the chair?     OBJECTIVE DATA SUMMARY:   Critical Behavior:  Neurologic State: Alert  Orientation Level: Oriented to situation, Oriented to place, Oriented to person, Disoriented to time  Cognition: Decreased command following, Decreased attention/concentration, Impulsive, Poor safety awareness, Memory loss  Safety/Judgement: Decreased insight into deficits, Decreased awareness of need for safety, Decreased awareness of need for assistance, Fall prevention  Functional Mobility Training:  Bed Mobility:  Rolling: Independent  Supine to Sit: Stand-by assistance     Scooting: Modified independent        Transfers:  Sit to Stand: Minimum assistance;Assist x2  Stand to Sit: Minimum assistance        Bed to Chair: Minimum assistance; Adaptive equipment(RW)                    Balance:  Sitting: Impaired  Sitting - Static: Good (unsupported)  Sitting - Dynamic: Fair (occasional)  Standing: Impaired  Standing - Static: Constant support;Good  Standing - Dynamic : Fair;Constant support  Ambulation/Gait Training:  Distance (ft): 35 Feet (ft)(15 + 20 feet)  Assistive Device: Gait belt;Walker, rolling  Ambulation - Level of Assistance: Minimal assistance;Contact guard assistance;Assist x2     Gait Description (WDL): Exceptions to WDL  Gait Abnormalities: Decreased step clearance; Step to gait; Path deviations  Right Side Weight Bearing: Full  Left Side Weight Bearing: Full  Base of Support: Narrowed     Speed/Marija: Slow  Step Length: Right shortened;Left shortened              Pain Rating:  No complaints during session. Activity Tolerance:   Fair and SpO2 stable on RA  Please refer to the flowsheet for vital signs taken during this treatment.     After treatment patient left in no apparent distress:   Sitting in chair, Call bell within reach, Bed / chair alarm activated, and Caregiver / family present    COMMUNICATION/COLLABORATION:   The patients plan of care was discussed with: Registered Nurse, , and Rehabilitation Attendant    Allie Diaz PT   Time Calculation: 26 mins

## 2020-02-24 NOTE — PROGRESS NOTES
Nephrology Progress Note  [facility]  [email or web address]                  Phone - [phone]       Patient: Tamara Bernstein. Date- 2/24/2020        Admit Date: 2/19/2020  YOB: 1929             CC: Follow up for RHABDOMYOLYSIS          Subjective: Interval History:   Ck increased to 1400  He is off ivf  k improved. ROS:- no sob  No nausea or vomiting     Assessment & Plan:     · rhabdomyolysis-due to fall and Zocor  · Hypokalemia  · hypertension  · dementia  · Elevated troponin-14.20  · Status post fall  · Hyponatremia due to dehydration  · Hyperlipidemia    PLAN-   Restart ivf - nacl 75 ml/hr   Avoid hypotonic ivf as na on lowside   Check bmp in am    Check CK level in the morning   Hold lisinopril   Hold  Zocor     Physical exam:   GEN:  NAD  NECK:  Supple, no thyromegaly  RESP: CTA  b/l, no  wheezing,   CVS: RRR,S1,S2   ABDO:  soft , non tender, No mass  NEURO: non focal, normal speech  EXT: no Edema +nt         Care Plan discussed with:NURSE,   Objective:   Visit Vitals  /59 (BP 1 Location: Left arm, BP Patient Position: At rest)   Pulse (!) 57   Temp 97.7 °F (36.5 °C)   Resp 20   Ht 5' 11\" (1.803 m)   Wt 78.9 kg (174 lb)   SpO2 99%   BMI 24.27 kg/m²     Last 3 Recorded Weights in this Encounter    02/21/20 0638 02/22/20 0638 02/24/20 0328   Weight: 74.3 kg (163 lb 12.8 oz) 74.8 kg (165 lb) 78.9 kg (174 lb)     02/22 1901 - 02/24 0700  In: 2250 [P.O.:460; I.V.:1790]  Out: -     Intake/Output Summary (Last 24 hours) at 2/24/2020 1124  Last data filed at 2/23/2020 2300  Gross per 24 hour   Intake 1890 ml   Output    Net 1890 ml      Chart reviewed. Pertinent Notes reviewed.        Medication list  reviewed   Current Facility-Administered Medications   Medication    0.9% sodium chloride infusion    clopidogreL (PLAVIX) tablet 75 mg    enoxaparin (LOVENOX) injection 40 mg    metoprolol tartrate (LOPRESSOR) tablet 25 mg    amLODIPine (NORVASC) tablet 2.5 mg    sodium chloride (NS) flush 5-40 mL    sodium chloride (NS) flush 5-40 mL    acetaminophen (TYLENOL) tablet 650 mg    ondansetron (ZOFRAN) injection 4 mg    bisacodyL (DULCOLAX) tablet 5 mg    aspirin delayed-release tablet 81 mg    albuterol-ipratropium (DUO-NEB) 2.5 MG-0.5 MG/3 ML    guaiFENesin (ROBITUSSIN) 100 mg/5 mL oral liquid 400 mg    cefTRIAXone (ROCEPHIN) 2 g in 0.9% sodium chloride (MBP/ADV) 50 mL           Data Review :  Recent Labs     02/24/20 0318 02/23/20  1405 02/23/20  0515 02/22/20  0249   * 134* 145 136   K 4.8 5.3* 2.8* 3.9    106 121* 106   CO2 23 24 17* 25   BUN 24* 25* 19 30*   CREA 0.91 0.88 0.45* 0.86   * 115* 72 115*   CA 8.4* 8.7 <5.0* 7.7*   MG 2.8*  --  1.6 2.4     Recent Labs     02/24/20 0318 02/23/20  0515 02/22/20  0249   WBC 9.5 8.1 12.1*   HGB 9.4* 8.1* 9.7*   HCT 28.0* 23.8* 28.3*    249 275     No results for input(s): FE, TIBC, PSAT, FERR in the last 72 hours.    Recent Labs     02/24/20 0318 02/23/20  0515 02/22/20  0249   CPK 1,453* 1,279* 4,310*     Lab Results   Component Value Date/Time    Color YELLOW/STRAW 02/19/2020 07:23 PM    Appearance CLOUDY (A) 02/19/2020 07:23 PM    Specific gravity 1.022 02/19/2020 07:23 PM    pH (UA) 5.5 02/19/2020 07:23 PM    Protein 300 (A) 02/19/2020 07:23 PM    Glucose 250 (A) 02/19/2020 07:23 PM    Ketone 15 (A) 02/19/2020 07:23 PM    Bilirubin NEGATIVE  02/19/2020 07:23 PM    Urobilinogen 0.2 02/19/2020 07:23 PM    Nitrites NEGATIVE  02/19/2020 07:23 PM    Leukocyte Esterase SMALL (A) 02/19/2020 07:23 PM    Epithelial cells MODERATE 02/19/2020 07:23 PM    Bacteria NEGATIVE  02/19/2020 07:23 PM    WBC 0-4 02/19/2020 07:23 PM    RBC 20-50 02/19/2020 07:23 PM     Lab Results   Component Value Date/Time    Culture result: NO GROWTH 4 DAYS 02/20/2020 12:45 AM     No results found for: SDES  No results found for: GOLDIE, CREAU  Results from Hospital Encounter encounter on 02/19/20   XR HAND RT AP/LAT    Narrative EXAM: XR HAND RT AP/LAT    INDICATION: ight wrist swollen. COMPARISON: None. FINDINGS: Two views of the right hand demonstrate no fracture or other acute  osseous or articular abnormality. There is soft tissue swelling about the radial  aspect of the thumb. Severe first CMC degenerative disease, and scattered IP  joint degenerative disease. Osteopenia. Impression IMPRESSION:   1. No acute abnormality. Soft tissue swelling about the radial aspect of the  thumb. 2. Severe first CMC degenerative disease. 3. Osteopenia. Neda Matthew MD  [facility]   [email or web address]  [facility]  [address] [facility] [address], [address] [place], [address]  Phone - [phone]         Fax - [phone]

## 2020-02-25 VITALS
BODY MASS INDEX: 24.39 KG/M2 | WEIGHT: 174.2 LBS | TEMPERATURE: 99.3 F | RESPIRATION RATE: 18 BRPM | HEART RATE: 64 BPM | OXYGEN SATURATION: 95 % | SYSTOLIC BLOOD PRESSURE: 158 MMHG | HEIGHT: 71 IN | DIASTOLIC BLOOD PRESSURE: 72 MMHG

## 2020-02-25 PROBLEM — F03.90 DEMENTIA (HCC): Status: ACTIVE | Noted: 2020-02-25

## 2020-02-25 PROBLEM — M62.82 RHABDOMYOLYSIS: Status: ACTIVE | Noted: 2020-02-25

## 2020-02-25 PROBLEM — E78.5 HYPERLIPIDEMIA: Status: ACTIVE | Noted: 2020-02-25

## 2020-02-25 PROBLEM — I10 HTN (HYPERTENSION): Status: ACTIVE | Noted: 2020-02-25

## 2020-02-25 PROBLEM — J18.9 PNEUMONIA: Status: ACTIVE | Noted: 2020-02-25

## 2020-02-25 LAB
ALBUMIN SERPL-MCNC: 1.9 G/DL (ref 3.5–5)
ALBUMIN/GLOB SERPL: 0.6 {RATIO} (ref 1.1–2.2)
ALP SERPL-CCNC: 64 U/L (ref 45–117)
ALT SERPL-CCNC: 57 U/L (ref 12–78)
ANION GAP SERPL CALC-SCNC: 5 MMOL/L (ref 5–15)
AST SERPL-CCNC: 82 U/L (ref 15–37)
BACTERIA SPEC CULT: NORMAL
BASOPHILS # BLD: 0.1 K/UL (ref 0–0.1)
BASOPHILS NFR BLD: 1 % (ref 0–1)
BILIRUB SERPL-MCNC: 0.5 MG/DL (ref 0.2–1)
BUN SERPL-MCNC: 22 MG/DL (ref 6–20)
BUN/CREAT SERPL: 26 (ref 12–20)
CALCIUM SERPL-MCNC: 8.1 MG/DL (ref 8.5–10.1)
CHLORIDE SERPL-SCNC: 107 MMOL/L (ref 97–108)
CK SERPL-CCNC: 782 U/L (ref 39–308)
CO2 SERPL-SCNC: 25 MMOL/L (ref 21–32)
CREAT SERPL-MCNC: 0.86 MG/DL (ref 0.7–1.3)
DIFFERENTIAL METHOD BLD: ABNORMAL
EOSINOPHIL # BLD: 0.5 K/UL (ref 0–0.4)
EOSINOPHIL NFR BLD: 6 % (ref 0–7)
ERYTHROCYTE [DISTWIDTH] IN BLOOD BY AUTOMATED COUNT: 14.8 % (ref 11.5–14.5)
GLOBULIN SER CALC-MCNC: 3.3 G/DL (ref 2–4)
GLUCOSE SERPL-MCNC: 101 MG/DL (ref 65–100)
HCT VFR BLD AUTO: 25 % (ref 36.6–50.3)
HGB BLD-MCNC: 8.4 G/DL (ref 12.1–17)
IMM GRANULOCYTES # BLD AUTO: 0 K/UL (ref 0–0.04)
IMM GRANULOCYTES NFR BLD AUTO: 0 % (ref 0–0.5)
LYMPHOCYTES # BLD: 0.8 K/UL (ref 0.8–3.5)
LYMPHOCYTES NFR BLD: 9 % (ref 12–49)
MAGNESIUM SERPL-MCNC: 2.6 MG/DL (ref 1.6–2.4)
MCH RBC QN AUTO: 30.4 PG (ref 26–34)
MCHC RBC AUTO-ENTMCNC: 33.6 G/DL (ref 30–36.5)
MCV RBC AUTO: 90.6 FL (ref 80–99)
MONOCYTES # BLD: 1 K/UL (ref 0–1)
MONOCYTES NFR BLD: 12 % (ref 5–13)
NEUTS SEG # BLD: 6 K/UL (ref 1.8–8)
NEUTS SEG NFR BLD: 72 % (ref 32–75)
NRBC # BLD: 0 K/UL (ref 0–0.01)
NRBC BLD-RTO: 0 PER 100 WBC
PLATELET # BLD AUTO: 311 K/UL (ref 150–400)
PMV BLD AUTO: 9.6 FL (ref 8.9–12.9)
POTASSIUM SERPL-SCNC: 4.3 MMOL/L (ref 3.5–5.1)
PROT SERPL-MCNC: 5.2 G/DL (ref 6.4–8.2)
RBC # BLD AUTO: 2.76 M/UL (ref 4.1–5.7)
SERVICE CMNT-IMP: NORMAL
SODIUM SERPL-SCNC: 137 MMOL/L (ref 136–145)
WBC # BLD AUTO: 8.3 K/UL (ref 4.1–11.1)

## 2020-02-25 PROCEDURE — 74011250637 HC RX REV CODE- 250/637: Performed by: INTERNAL MEDICINE

## 2020-02-25 PROCEDURE — 74011250637 HC RX REV CODE- 250/637: Performed by: NURSE PRACTITIONER

## 2020-02-25 PROCEDURE — 94760 N-INVAS EAR/PLS OXIMETRY 1: CPT

## 2020-02-25 PROCEDURE — 83735 ASSAY OF MAGNESIUM: CPT

## 2020-02-25 PROCEDURE — 82550 ASSAY OF CK (CPK): CPT

## 2020-02-25 PROCEDURE — 74011250636 HC RX REV CODE- 250/636: Performed by: INTERNAL MEDICINE

## 2020-02-25 PROCEDURE — 36415 COLL VENOUS BLD VENIPUNCTURE: CPT

## 2020-02-25 PROCEDURE — 85025 COMPLETE CBC W/AUTO DIFF WBC: CPT

## 2020-02-25 PROCEDURE — 80053 COMPREHEN METABOLIC PANEL: CPT

## 2020-02-25 RX ORDER — CEFUROXIME AXETIL 500 MG/1
500 TABLET ORAL 2 TIMES DAILY
Qty: 14 TAB | Refills: 0 | Status: SHIPPED | OUTPATIENT
Start: 2020-02-25 | End: 2020-03-03

## 2020-02-25 RX ORDER — ASPIRIN 81 MG/1
81 TABLET ORAL DAILY
Qty: 30 TAB | Refills: 1 | Status: SHIPPED | OUTPATIENT
Start: 2020-02-25

## 2020-02-25 RX ORDER — AMLODIPINE BESYLATE 5 MG/1
5 TABLET ORAL DAILY
Status: DISCONTINUED | OUTPATIENT
Start: 2020-02-25 | End: 2020-02-25 | Stop reason: HOSPADM

## 2020-02-25 RX ORDER — CLOPIDOGREL BISULFATE 75 MG/1
75 TABLET ORAL DAILY
Qty: 30 TAB | Refills: 1 | Status: SHIPPED | OUTPATIENT
Start: 2020-02-25

## 2020-02-25 RX ORDER — ACETAMINOPHEN 325 MG/1
650 TABLET ORAL
Qty: 40 TAB | Refills: 0 | Status: SHIPPED | OUTPATIENT
Start: 2020-02-25

## 2020-02-25 RX ORDER — METOPROLOL TARTRATE 25 MG/1
25 TABLET, FILM COATED ORAL EVERY 12 HOURS
Qty: 60 TAB | Refills: 1 | Status: SHIPPED | OUTPATIENT
Start: 2020-02-25

## 2020-02-25 RX ORDER — AMLODIPINE BESYLATE 5 MG/1
5 TABLET ORAL DAILY
Qty: 30 TAB | Refills: 1 | Status: SHIPPED | OUTPATIENT
Start: 2020-02-25

## 2020-02-25 RX ORDER — QUETIAPINE FUMARATE 25 MG/1
25 TABLET, FILM COATED ORAL
Qty: 30 TAB | Refills: 1 | Status: SHIPPED | OUTPATIENT
Start: 2020-02-25

## 2020-02-25 RX ORDER — GUAIFENESIN 100 MG/5ML
400 SOLUTION ORAL 3 TIMES DAILY
Qty: 473 ML | Refills: 0 | Status: SHIPPED | OUTPATIENT
Start: 2020-02-25

## 2020-02-25 RX ADMIN — Medication 10 ML: at 04:59

## 2020-02-25 RX ADMIN — SODIUM CHLORIDE 75 ML/HR: 900 INJECTION, SOLUTION INTRAVENOUS at 02:06

## 2020-02-25 RX ADMIN — ASPIRIN 81 MG: 81 TABLET ORAL at 09:18

## 2020-02-25 RX ADMIN — METOPROLOL TARTRATE 25 MG: 25 TABLET ORAL at 09:18

## 2020-02-25 RX ADMIN — AMLODIPINE BESYLATE 5 MG: 5 TABLET ORAL at 09:18

## 2020-02-25 RX ADMIN — CLOPIDOGREL BISULFATE 75 MG: 75 TABLET ORAL at 09:18

## 2020-02-25 RX ADMIN — ACETAMINOPHEN 650 MG: 325 TABLET ORAL at 06:47

## 2020-02-25 NOTE — PROGRESS NOTES
Received report from [facility]. Assumed care of patient.

## 2020-02-25 NOTE — DISCHARGE SUMMARY
Hospitalist Discharge Summary     Patient ID:  Yvette Mendez  [id number]  80 y.o.  5/10/1929  2/19/2020    PCP on record: Devan Ventura MD    Admit date: 2/19/2020  Discharge date and time: 2/25/2020    DISCHARGE DIAGNOSIS:    NSTEMI, Rhabdomyolysis, Pneumonia, HTN, Hyperlipidemia, Hypokalemia, Hypomagnesemia, s/p Fall, Dementia    CONSULTATIONS:  IP CONSULT TO CARDIOLOGY  IP CONSULT TO NEPHROLOGY    Excerpted HPI from H&P of Noé Alvarado MD:  80year old male from home PMHX with Dementia ,HTN , Hypercholesteremia presented to the Hospital s/p fall patient was found on the bathroom floor by caregiver patient unsure what happened and for how long he been down, patient denies any chest pain denies any complaint, blood work was significant for elevated white blood cell count 15.3, troponin was noticed to be elevated 6.50.     We were asked to admit for work up and evaluation of the above problems. ______________________________________________________________________  DISCHARGE SUMMARY/HOSPITAL COURSE:  for full details see H&P, daily progress notes, labs, consult notes. NSTEMI: As per Cardiology medical management, c/w ASA, Plavix, BB, avoid statin for now due to Rhabdomyolysis. Cardiology in the case. D/vy Heparin drip, so far stable  Acute Rhabdomyolysis: c/w IVF, D/C  low dose Bicarbonate,  Nephrology help appreciated, CK trending up to 1400, c/w IVF and monitor  RLL Pneumonia: c/w CTX, completed Z-max, bronchodilators, mucolytics, F/U sputum. HTN : increased  BB, monitor, so far good control  Hypercholesteremia  D/c Statin    Hypokalemia: replace and monitor  Hypocalcemia: replace and monitor. Hypomagnesemia: replace and monitor  S/p Fall: multiple bruises in legs, get wound care.   Dementia/Cerebral Atrophy: noted in CT, sundown at times, monitor, no need for sitter, if patient becomes increasingly confused at night may need low dose seroquel at night, but will try to delay this until is absolutely necessary, will add low dose Seroquel at night  Surrogate Decision Maker:Priyanka Veronica [phone]  Code status: Full  Prophylaxis: lovenox  Recommended Disposition: SNF/LTC     D/c to [facility] and F/U with PCP and Cardiology      _______________________________________________________________________  Patient seen and examined by me on discharge day. Pertinent Findings:  Gen:    Not in distress  Chest: Coarse BS  CVS:   Regular rhythm. No edema  Abd:  Soft, not distended, not tender  Neuro:  Alert, *oriented x2, GCS [id number], no gross focal defictis  _______________________________________________________________________  DISCHARGE MEDICATIONS:   Current Discharge Medication List      START taking these medications    Details   acetaminophen (TYLENOL) 325 mg tablet Take 2 Tabs by mouth every six (6) hours as needed for Pain or Fever. Qty: 40 Tab, Refills: 0      amLODIPine (NORVASC) 5 mg tablet Take 1 Tab by mouth daily. Qty: 30 Tab, Refills: 1      aspirin delayed-release 81 mg tablet Take 1 Tab by mouth daily. Qty: 30 Tab, Refills: 1      clopidogreL (PLAVIX) 75 mg tab Take 1 Tab by mouth daily. Qty: 30 Tab, Refills: 1      guaiFENesin (ROBITUSSIN) 100 mg/5 mL liquid Take 20 mL by mouth three (3) times daily. Qty: 473 mL, Refills: 0      metoprolol tartrate (LOPRESSOR) 25 mg tablet Take 1 Tab by mouth every twelve (12) hours. Qty: 60 Tab, Refills: 1      QUEtiapine (SEROQUEL) 25 mg tablet Take 1 Tab by mouth nightly. At 1900  Qty: 30 Tab, Refills: 1      cefUROXime (CEFTIN) 500 mg tablet Take 1 Tab by mouth two (2) times a day for 7 days. Qty: 14 Tab, Refills: 0         STOP taking these medications       LISINOPRIL PO Comments:   Reason for Stopping:         SIMVASTATIN PO Comments:   Reason for Stopping:         magnesium 250 mg tab Comments:   Reason for Stopping:                 Patient Follow Up Instructions:    Activity: Activity as tolerated  Diet: Cardiac Diet  Wound Care: Keep wound clean and dry    Follow-up with PCP, Cardiology in 2 weeks.   Follow-up tests/labs none  Follow-up Information     Follow up With Specialties Details Why Contact Info    [facility] [address]  [address]   [address] [facility] [facility]    Valeriano Maurer MD Family Practice In 2 weeks  [address] [facility] [address]      Ling Kumar MD Cardiology, Vascular Surgery, General and Vascular Surgery In 2 weeks  [facility]  [phone]          ________________________________________________________________    Risk of deterioration: Moderate    Condition at Discharge:  Stable  __________________________________________________________________    Disposition  SNF/LTC    ____________________________________________________________________    Code Status: Full Code  ___________________________________________________________________      Total time in minutes spent coordinating this discharge (includes going over instructions, follow-up, prescriptions, and preparing report for sign off to her PCP) :  35 minutes    Signed:  Erik Tinsley MD

## 2020-02-25 NOTE — DISCHARGE INSTRUCTIONS
HOSPITALIST DISCHARGE INSTRUCTIONS    NAME: Suellen Castro :  5/10/1929   MRN:  [id number]     Date/Time:  2020 7:48 AM    ADMIT DATE: 2020   DISCHARGE DATE: 2020     Attending Physician: Delmis Watkins MD    DISCHARGE DIAGNOSIS:  NSTEMI, Rhabdomyolysis, Pneumonia, HTN, Hyperlipidemia, Hypokalemia, Hypomagnesemia, s/p Fall, Dementia      Medications: Per above medication reconciliation. Pain Management: per above medications    Recommended diet: Cardiac Diet    Recommended activity: Activity as tolerated    Wound care: as directed   The leg wounds were cleansed with Carraklenz spray and wiped with gauze to remove the old drainage. Xeroform gauze was applied to the broken skin and the intact blister skin and then covered with a secondary ABD dressing and wrapped with Tyree Edita. Heels floated to protect the heels and the right leg is on a second pillow to elevate slightly higher. Pulses still palpable when elevated. Indwelling devices:  None    Supplemental Oxygen: None    Required Lab work: Per SNF routine    Glucose management:  None    Code status: Full        Outside physician follow up: Follow-up Information     Follow up With Specialties Details Why Contact Info    [address]    Adeline Orr MD Family Practice In 2 weeks  [facility] [address]      Cesar Corral MD Cardiology, Vascular Surgery, General and Vascular Surgery In 2 weeks  [address] [facility]  [phone]                 Skilled nursing facility/ SNF MD responsible for above on discharge. Information obtained by :  I understand that if any problems occur once I am at home I am to contact my physician. I understand and acknowledge receipt of the instructions indicated above. Physician's or R.N.'s Signature                                                                  Date/Time                                                                                                                                              Patient or Repres

## 2020-02-25 NOTE — PROGRESS NOTES
RAJAN: [facility]    Transition of Care Plan to SNF/Rehab    SNF/Rehab Transition:  Patient has been accepted to [facility] and meets criteria for admission. Patient will transported by 10:00am and expected to leave at [facility] Communication to Patient/Family:  Met with patient and Lulú Concepcion (identified care giver) and they are agreeable to the transition plan. Communication to SNF/Rehab:  Bedside RN, Kristel Rush, has been notified to update the transition plan to the facility and call report (phone number [phone]). Discharge information has been updated on the AVS.     Discharge instructions to be fax'd to facility at [facility] # [phone]). Nursing Please include all hard scripts for controlled substances, med rec and dc summary, and AVS in packet. SNF/Rehab Transition:  Patient to follow-up with Home Health: N/A  PCP/Specialist: N/A    Reviewed and confirmed with facility, [facility] they can manage the patient care needs for the following:     Dov with (X) only those applicable:    Medication:  [x]  Medications will be available at the facility  []  IV Antibiotics   []  Controlled Substance - hard copy to be sent with patient   []  Weekly Labs   Documents:  [x] Hard RX  [] MAR  [] Kardex  [x] AVS  [x]Transfer Summary  [x]Discharge   Equipment:  []  CPAP/BiPAP  []  Wound Vacuum  []  Forbes or Urinary Device  []  PICC/Central Line  []  Nebulizer  []  Ventilator   Treatment:  []Isolation (for MRSA, VRE, etc.)  []Surgical Drain Management  []Tracheostomy Care  [x]Dressing Changes  []Dialysis with transportation and chair time   []PEG Care  []Oxygen  []Daily Weights for Heart Failure   Dietary:  [x]Any diet limitations Mechanical soft diet  []Tube Feedings   []Total Parenteral Management (TPN)   Eligible for Medicaid Long Term Services and Supports  Yes:  [] Eligible for medical assistance or will become eligible within 180 days and UAI completed. [] Provider/Patient and/or support system has requested screening. [] UAI copy provided to patient or responsible party,   [] UAI unavailable at discharge will send once processed to SNF provider. [] UAI unavailable at discharged mailed to patient  No:   [x] Private pay and is not financially eligible for Medicaid within the next 180 days. [] Reside out-of-state. [] A residents of a state owned/operated facility that is licensed  by [facility] or Located within [facility]  [] Enrollment in [facility] hospice services  [] [address]  [] Patient /Family declines to have screening completed or provide financial information for screening     Financial Resources:  Medicaid    [] Initiated and application pending   [] Full coverage     Advanced Care Plan:  []Surrogate Decision Maker of Care  []POA  [x]Communicated Code Status FULL Code   Other       Care Management Interventions  PCP Verified by CM:  Yes  Mode of Transport at Discharge: BLS(Pt will be transported by medical transport)  Transition of Care Consult (CM Consult): SNF([facility] [place])  Partner SNF: Yes  Discharge Durable Medical Equipment: No  Physical Therapy Consult: Yes  Occupational Therapy Consult: Yes  Speech Therapy Consult: No  Current Support Network: Own Home, Lives Alone  Confirm Follow Up Transport: Family  The Plan for Transition of Care is Related to the Following Treatment Goals : SNF  The Patient and/or Patient Representative was Provided with a Choice of Provider and Agrees with the Discharge Plan?: Yes  Name of the Patient Representative Who was Provided with a Choice of Provider and Agrees with the Discharge Plan: Alfred Carmichael  Freedom of Choice List was Provided with Basic Dialogue that Supports the Patient's Individualized Plan of Care/Goals, Treatment Preferences and Shares the Quality Data Associated with the Providers?: Yes  Gomez Provided?: No  Discharge Location  Discharge Placement: Skilled nursing facility([facility] )    Saeed Zuñiga [address], [facility]  [phone]

## 2020-02-25 NOTE — PROGRESS NOTES
Hospital to [facility] [address]                                                                        80 y.o.   male    [address]   Room: 2207/01    [facility] 2 CARDIOPULMONARY CARE  Unit Phone# :  [phone]      [address]  [facility] 2 CARDIOPULMONARY CARE  [address]  [place] [address]  Dept: [phone]  Loc: [phone]                    SITUATION     Admitted:  2/19/2020         Attending Provider:  Sofia Peace MD       Consultations:  IP CONSULT TO CARDIOLOGY  IP CONSULT TO NEPHROLOGY    PCP:  Maricruz Stevenson MD   [phone]    Treatment Team: Attending Provider: Sofia Peace MD; Consulting Provider: Mitesh Dueñas MD; Utilization Review: Arash Mustafa RN; Care Manager: Yvan Sarabia; Care Manager: Prince Melchor Primary Nurse: Nicanor Odonnell    Admitting Dx:  NSTEMI (non-ST elevated myocardial infarction) [facility]) [I21.4]       Principal Problem: <principal problem not specified>    * No surgery found * of      BY: * Surgery not found *             ON: * No surgery found *                  Code Status: Full Code                Advance Directives:   Advance Care Planning 2/20/2020   Confirm Advance Directive None   Patient Would Like to Complete Advance Directive No    (Send w/patient)   Not Received       Isolation:  There are currently no Active Isolations       MDRO: No current active infections    Pain Medications given:  acetaminophen    Last dose: 2/25/2020 at  [phone]    Special Equipment needed: no  Type of equipment:         BACKGROUND     Allergies:  No Known Allergies    Past Medical History:   Diagnosis Date    High blood pressure     High cholesterol     Prostate cancer ([facility] [address]        History reviewed. No pertinent surgical history. Medications Prior to Admission   Medication Sig    LISINOPRIL PO Take  by mouth.  SIMVASTATIN PO Take  by mouth.     magnesium 250 mg tab Take  by mouth as needed. Indications: leg cramps       Hard scripts included in transfer packet yes    Vaccinations: There is no immunization history on file for this patient. Readmission Risks:    Known Risks: fall        The Charlson CoMorbitiy Index tool is an evidenced based tool that has more automatic generated information. The tool looks at many different items such as the age of the patient, how many times they were admitted in the last calendar year, current length of stay in the hospital and their diagnosis. All of these items are pulled automatically from information documented in the chart from various places and will generate a score that predicts whether a patient is at low (less than 13), medium (13-20) or high (21 or greater) risk of being readmitted.         ASSESSMENT                Temp: 99.3 °F (37.4 °C) (02/25/20 0720) Pulse (Heart Rate): 67 (02/25/20 0720)     Resp Rate: 18 (02/25/20 0720)           BP: 145/56 (02/25/20 0720)     O2 Sat (%): 95 % (02/25/20 0720)     Weight: 79 kg (174 lb 3.2 oz)    Height: 5' 11\" (180.3 cm) (02/20/20 0908)       If above not within 1 hour of discharge:    BP:_____  P:____  R:____ T:_____ O2 Sat: ___%  O2: ______    Active Orders   Diet    DIET MECHANICAL SOFT         Orientation: only aware of  place, person and situation     Active Behaviors: None                                   Active Lines/Drains:  (Peg Tube / Forbes / CL or S/L?): no    Urinary Status: Incontinent     Last BM: Last Bowel Movement Date: 02/25/20     Skin Integrity: Wound (add Wound LDA)     Wound right leg       Mobility: Very limited   Weight Bearing Status: WBAT (Weight Bearing as Tolerated)      Gait Training  Assistive Device: Gait belt, Walker, rolling  Ambulation - Level of Assistance: Minimal assistance, Contact guard assistance, Assist x2  Distance (ft): 35 Feet (ft)(15 + 20 feet)         Lab Results   Component Value Date/Time    Glucose 101 (H) 02/25/2020 04:57 AM    HGB 8.4 (L) 02/25/2020 04:57 AM    HGB 9.4 (L) 02/24/2020 03:18 AM        RECOMMENDATION     See After Visit Summary (AVS) for:  · Discharge instructions  · After [address]   · Special equipment needed (entered pre-discharge by Care Management)  · Medication Reconciliation    · Follow up Appointment(s)         Report given/sent by:  Billy Banegas                    Verbal report given to: Cosmo Read  FAXED to:           Estimated discharge time:  2/25/2020 at 1000

## 2020-02-25 NOTE — PROGRESS NOTES
CM verified patient has a qualifying hospital stay for [facility].      Jennie Savage, MSW  Ext [phone]

## 2020-08-03 ENCOUNTER — HOSPITAL ENCOUNTER (EMERGENCY)
Age: 85
Discharge: HOME | End: 2020-08-03
Attending: EMERGENCY MEDICINE
Payer: MEDICARE

## 2020-08-03 ENCOUNTER — APPOINTMENT (OUTPATIENT)
Dept: GENERAL RADIOLOGY | Age: 85
End: 2020-08-03
Attending: EMERGENCY MEDICINE
Payer: MEDICARE

## 2020-08-03 ENCOUNTER — APPOINTMENT (OUTPATIENT)
Dept: ULTRASOUND IMAGING | Age: 85
End: 2020-08-03
Attending: EMERGENCY MEDICINE
Payer: MEDICARE

## 2020-08-03 VITALS
OXYGEN SATURATION: 98 % | HEIGHT: 72 IN | HEART RATE: 54 BPM | SYSTOLIC BLOOD PRESSURE: 177 MMHG | RESPIRATION RATE: 16 BRPM | TEMPERATURE: 98 F | DIASTOLIC BLOOD PRESSURE: 78 MMHG | WEIGHT: 180 LBS | BODY MASS INDEX: 24.38 KG/M2

## 2020-08-03 DIAGNOSIS — M71.21 BAKER'S CYST OF KNEE, RIGHT: Primary | ICD-10-CM

## 2020-08-03 DIAGNOSIS — M79.604 RIGHT LEG PAIN: ICD-10-CM

## 2020-08-03 PROCEDURE — 74011250637 HC RX REV CODE- 250/637: Performed by: EMERGENCY MEDICINE

## 2020-08-03 PROCEDURE — 93971 EXTREMITY STUDY: CPT

## 2020-08-03 PROCEDURE — 73610 X-RAY EXAM OF ANKLE: CPT

## 2020-08-03 PROCEDURE — 99284 EMERGENCY DEPT VISIT MOD MDM: CPT

## 2020-08-03 RX ORDER — METHOCARBAMOL 750 MG/1
750 TABLET, FILM COATED ORAL
Qty: 20 TAB | Refills: 0 | Status: SHIPPED | OUTPATIENT
Start: 2020-08-03

## 2020-08-03 RX ORDER — METHOCARBAMOL 750 MG/1
750 TABLET, FILM COATED ORAL
Status: COMPLETED | OUTPATIENT
Start: 2020-08-03 | End: 2020-08-03

## 2020-08-03 RX ADMIN — METHOCARBAMOL TABLETS 750 MG: 750 TABLET, COATED ORAL at 12:22

## 2020-08-03 NOTE — ED PROVIDER NOTES
EMERGENCY DEPARTMENT HISTORY AND PHYSICAL EXAM      Date: 8/3/2020  Patient Name: Regino Robledo. History of Presenting Illness     Chief Complaint   Patient presents with    Ankle Pain       History Provided By: Patient and EMS    HPI: Regino Robledo., 80 y.o. male presents to the ED with cc of right ankle and leg pain. Patient symptoms started this morning. He has intermittent sharp shooting pain in the right lower leg to the ankle. He denies trauma, fever or chills. He also denies chest pain, cough, shortness of breath or recent immobilization. The pain is an 8 out of 10 at its worse, and last for seconds at a time. There are no other complaints, changes, or physical findings at this time. PCP: Rachana Baptiste MD    No current facility-administered medications on file prior to encounter. Current Outpatient Medications on File Prior to Encounter   Medication Sig Dispense Refill    acetaminophen (TYLENOL) 325 mg tablet Take 2 Tabs by mouth every six (6) hours as needed for Pain or Fever. 40 Tab 0    amLODIPine (NORVASC) 5 mg tablet Take 1 Tab by mouth daily. 30 Tab 1    aspirin delayed-release 81 mg tablet Take 1 Tab by mouth daily. 30 Tab 1    clopidogreL (PLAVIX) 75 mg tab Take 1 Tab by mouth daily. 30 Tab 1    guaiFENesin (ROBITUSSIN) 100 mg/5 mL liquid Take 20 mL by mouth three (3) times daily. 473 mL 0    metoprolol tartrate (LOPRESSOR) 25 mg tablet Take 1 Tab by mouth every twelve (12) hours. 60 Tab 1    QUEtiapine (SEROQUEL) 25 mg tablet Take 1 Tab by mouth nightly. At 1900 30 Tab 1       Past History     Past Medical History:  Past Medical History:   Diagnosis Date    High blood pressure     High cholesterol     Prostate cancer [facility])        Past Surgical History:  No past surgical history on file.     Family History:  Family History   Problem Relation Age of Onset    Hypertension Mother     Hypertension Father        Social History:  Social History     Tobacco Use    Smoking status: Not on file   Substance Use Topics    Alcohol use: Not on file    Drug use: Not on file       Allergies:  No Known Allergies      Review of Systems   Review of Systems   Constitutional: Negative for chills and fever. HENT: Negative for congestion. Eyes: Negative. Respiratory: Negative for shortness of breath. Cardiovascular: Negative for chest pain. Gastrointestinal: Negative for abdominal pain. Endocrine: Negative for heat intolerance. Genitourinary: Negative. Musculoskeletal: Negative for back pain. Skin: Negative for rash. Allergic/Immunologic: Negative for immunocompromised state. Neurological: Negative for dizziness. Hematological: Does not bruise/bleed easily. Psychiatric/Behavioral: Negative. All other systems reviewed and are negative. Physical Exam   Physical Exam  Vitals signs and nursing note reviewed. Constitutional:       General: He is not in acute distress. Appearance: He is well-developed. HENT:      Head: Normocephalic and atraumatic. Neck:      Musculoskeletal: Normal range of motion. Cardiovascular:      Rate and Rhythm: Regular rhythm. Bradycardia present. Heart sounds: Normal heart sounds. Pulmonary:      Effort: Pulmonary effort is normal.      Breath sounds: Normal breath sounds. Abdominal:      General: Bowel sounds are normal.      Palpations: Abdomen is soft. Musculoskeletal: Normal range of motion. General: No tenderness. Skin:     General: Skin is warm and dry. Neurological:      General: No focal deficit present. Mental Status: He is alert and oriented to person, place, and time. Coordination: Coordination normal.   Psychiatric:         Mood and Affect: Mood normal.         Behavior: Behavior normal.         Diagnostic Study Results     Labs -   No results found for this or any previous visit (from the past 12 hour(s)).     Radiologic Studies -   XR ANKLE RT MIN 3 V   Final Result   IMPRESSION: Osteopenic right ankle. No fracture seen. CT Results  (Last 48 hours)    None        CXR Results  (Last 48 hours)    None          Medical Decision Making   I am the first provider for this patient. I reviewed the vital signs, available nursing notes, past medical history, past surgical history, family history and social history. Vital Signs-Reviewed the patient's vital signs. Patient Vitals for the past 12 hrs:   Temp Pulse Resp BP SpO2   08/03/20 1243 98 °F (36.7 °C) (!) 54 16 177/78 98 %   08/03/20 1049 97.1 °F (36.2 °C) (!) 52 16 156/72 100 %       Records Reviewed: Nursing Notes, Old Medical Records and Ambulance Run Sheet    Provider Notes (Medical Decision Making): Arthritis, muscle spasm, DVT, Baker's cyst    ED Course:   Initial assessment performed. The patients presenting problems have been discussed, and they are in agreement with the care plan formulated and outlined with them. I have encouraged them to ask questions as they arise throughout their visit. Progress note:    Patient's results were reviewed. The patient is feeling better. He is advised to follow-up and return ER if worse             Critical Care Time:   0    Disposition:  home      Remove if not discharged  DISCHARGE PLAN:  1. Current Discharge Medication List      START taking these medications    Details   methocarbamoL (Robaxin-750) 750 mg tablet Take 1 Tab by mouth four (4) times daily as needed for Muscle Spasm(s). Qty: 20 Tab, Refills: 0           2. Follow-up Information     Follow up With Specialties Details Why Contact Info    iWlian Roa MD Internal Medicine In 2 days As needed [facility] 7th [address] [facility]  [phone]      [facility] [address] [facility] DEPT Emergency Medicine  As needed, If symptoms worsen [address]  [facility] [address]   [address] [address] [phone]        3. Return to ED if worse     Diagnosis     Clinical Impression:   1.  Baker's cyst of knee, right 2. Right leg pain        Attestations:    Annabel Noyola MD    Please note that this dictation was completed with [employer], the computer voice recognition software. Quite often unanticipated grammatical, syntax, homophones, and other interpretive errors are inadvertently transcribed by the computer software. Please disregard these errors. Please excuse any errors that have escaped final proofreading. Thank you.

## 2020-08-03 NOTE — ED TRIAGE NOTES
Resident from the [employer] arrived by EMS for right ankle pain frequent. Sent for evaluation.

## 2020-08-03 NOTE — DISCHARGE INSTRUCTIONS
Patient Education        Wharton's Cyst: Care Instructions  Your Care Instructions     A Baker's cyst is a swelling behind the knee. It may cause pain or stiffness when you bend your knee or straighten it all the way. Baker's cysts are also called popliteal cysts. If you have arthritis or another condition that is the cause of the Baker's cyst, your doctor may treat that condition. A Baker's cyst may go away on its own. If not, or if it is causing a lot of discomfort, your doctor may drain the fluid that has built up behind the knee. In some cases, a Baker's cyst is removed in surgery. There are things you can do at home, such as staying off your leg, to reduce the swelling and pain. Follow-up care is a key part of your treatment and safety. Be sure to make and go to all appointments, and call your doctor if you are having problems. It's also a good idea to know your test results and keep a list of the medicines you take. How can you care for yourself at home? · Rest your knee as much as possible. · Ask your doctor if you can take an over-the-counter pain medicine, such as acetaminophen (Tylenol), ibuprofen (Advil, Motrin), or naproxen (Aleve). Be safe with medicines. Read and follow all instructions on the label. · Use a cane, a crutch, a walker, or another device if you need help to get around. These can help rest your knees. · If you have an elastic bandage, make sure it is snug but not so tight that your leg is numb, tingles, or swells below the bandage. Ask your doctor if you can loosen the bandage if it is too tight. · Follow your doctor's instructions about how much weight you can put on your knee. · Stay at a healthy weight. Being overweight puts extra strain on your knee. When should you call for help? [id number] anytime you think you may need emergency care. For example, call if:  · You have chest pain, are short of breath, or you cough up blood.   Call your doctor now or seek immediate medical care if:  · You have new or worse pain. · Your foot is cool or pale or changes color. · You have tingling, weakness, or numbness in your foot or toes. · You have signs of a blood clot in your leg (called a deep vein thrombosis), such as:  ? Pain in your calf, back of the knee, thigh, or groin. ? Redness or swelling in your leg. Watch closely for changes in your health, and be sure to contact your doctor if:  · You do not get better as expected. Where can you learn more? Go to http://bridget-dennis.info/  Enter S739 in the search box to learn more about \"Baker's Cyst: Care Instructions. \"  Current as of: March 2, 2020               Content Version: 12.5  © 5707-3426 [employer]. Care instructions adapted under license by [employer] (which disclaims liability or warranty for this information). If you have questions about a medical condition or this instruction, always ask your healthcare professional. [place] [address] any warranty or liability for your use of this information. Patient Education        Leg Pain: Care Instructions  Your Care Instructions  Many things can cause leg pain. Too much exercise or overuse can cause a muscle cramp (or charley horse). You can get leg cramps from not eating a balanced diet that has enough potassium, calcium, and other minerals. If you do not drink enough fluids or are taking certain medicines, you may develop leg cramps. Other causes of leg pain include injuries, blood flow problems, nerve damage, and twisted and enlarged veins (varicose veins). You can usually ease pain with self-care. Your doctor may recommend that you rest your leg and keep it elevated. Follow-up care is a key part of your treatment and safety. Be sure to make and go to all appointments, and call your doctor if you are having problems. It's also a good idea to know your test results and keep a list of the medicines you take.   How can you care for yourself at home? · Take pain medicines exactly as directed. ? If the doctor gave you a prescription medicine for pain, take it as prescribed. ? If you are not taking a prescription pain medicine, ask your doctor if you can take an over-the-counter medicine. · Take any other medicines exactly as prescribed. Call your doctor if you think you are having a problem with your medicine. · Rest your leg while you have pain, and avoid standing for long periods of time. · Prop up your leg at or above the level of your heart when possible. · Make sure you are eating a balanced diet that is rich in calcium, potassium, and magnesium, especially if you are pregnant. · If directed by your doctor, put ice or a cold pack on the area for 10 to 20 minutes at a time. Put a thin cloth between the ice and your skin. · Your leg may be in a splint, a brace, or an elastic bandage, and you may have crutches to help you walk. Follow your doctor's directions about how long to wear supports and how to use the crutches. When should you call for help? [id number] anytime you think you may need emergency care. For example, call if:  · You have sudden chest pain and shortness of breath, or you cough up blood. · Your leg is cool or pale or changes color. Call your doctor now or seek immediate medical care if:  · You have increasing or severe pain. · Your leg suddenly feels weak and you cannot move it. · You have signs of a blood clot, such as:  ? Pain in your calf, back of the knee, thigh, or groin. ? Redness and swelling in your leg or groin. · You have signs of infection, such as:  ? Increased pain, swelling, warmth, or redness. ? Red streaks leading from the sore area. ? Pus draining from a place on your leg. ? A fever. · You cannot bear weight on your leg. Watch closely for changes in your health, and be sure to contact your doctor if:  · You do not get better as expected. Where can you learn more?   Go to http://bridget-dennis.info/  Enter W775 in the search box to learn more about \"Leg Pain: Care Instructions. \"  Current as of: June 26, 2019               Content Version: 12.5  © 2165-9868 [employer]. Care instructions adapted under license by [employer] (which disclaims liability or warranty for this information). If you have questions about a medical condition or this instruction, always ask your healthcare professional. [place] [address] any warranty or liability for your use of this information.

## 2021-11-24 ENCOUNTER — HOSPITAL ENCOUNTER (EMERGENCY)
Age: 86
Discharge: HOME | End: 2021-11-24
Attending: EMERGENCY MEDICINE
Payer: MEDICARE

## 2021-11-24 ENCOUNTER — APPOINTMENT (OUTPATIENT)
Dept: GENERAL RADIOLOGY | Age: 86
End: 2021-11-24
Attending: EMERGENCY MEDICINE
Payer: MEDICARE

## 2021-11-24 VITALS
RESPIRATION RATE: 18 BRPM | HEART RATE: 57 BPM | SYSTOLIC BLOOD PRESSURE: 119 MMHG | OXYGEN SATURATION: 91 % | TEMPERATURE: 98.5 F | DIASTOLIC BLOOD PRESSURE: 57 MMHG

## 2021-11-24 DIAGNOSIS — J18.9 COMMUNITY ACQUIRED PNEUMONIA, UNSPECIFIED LATERALITY: Primary | ICD-10-CM

## 2021-11-24 LAB
ALBUMIN SERPL-MCNC: 2.6 G/DL (ref 3.5–5)
ALBUMIN/GLOB SERPL: 0.6 {RATIO} (ref 1.1–2.2)
ALP SERPL-CCNC: 131 U/L (ref 45–117)
ALT SERPL-CCNC: 21 U/L (ref 12–78)
ANION GAP SERPL CALC-SCNC: 5 MMOL/L (ref 5–15)
AST SERPL-CCNC: 35 U/L (ref 15–37)
BASOPHILS # BLD: 0.1 K/UL (ref 0–0.1)
BASOPHILS NFR BLD: 2 % (ref 0–1)
BILIRUB SERPL-MCNC: 0.3 MG/DL (ref 0.2–1)
BUN SERPL-MCNC: 17 MG/DL (ref 6–20)
BUN/CREAT SERPL: 14 (ref 12–20)
CALCIUM SERPL-MCNC: 8.8 MG/DL (ref 8.5–10.1)
CHLORIDE SERPL-SCNC: 100 MMOL/L (ref 97–108)
CO2 SERPL-SCNC: 31 MMOL/L (ref 21–32)
COVID-19 RAPID TEST, COVR: NOT DETECTED
CREAT SERPL-MCNC: 1.2 MG/DL (ref 0.7–1.3)
DIFFERENTIAL METHOD BLD: ABNORMAL
EOSINOPHIL # BLD: 0.4 K/UL (ref 0–0.4)
EOSINOPHIL NFR BLD: 7 % (ref 0–7)
ERYTHROCYTE [DISTWIDTH] IN BLOOD BY AUTOMATED COUNT: 14.5 % (ref 11.5–14.5)
GLOBULIN SER CALC-MCNC: 4.6 G/DL (ref 2–4)
GLUCOSE SERPL-MCNC: 148 MG/DL (ref 65–100)
HCT VFR BLD AUTO: 39.6 % (ref 36.6–50.3)
HGB BLD-MCNC: 12.7 G/DL (ref 12.1–17)
IMM GRANULOCYTES # BLD AUTO: 0 K/UL (ref 0–0.04)
IMM GRANULOCYTES NFR BLD AUTO: 0 % (ref 0–0.5)
LACTATE BLD-SCNC: 1.51 MMOL/L (ref 0.4–2)
LYMPHOCYTES # BLD: 1.2 K/UL (ref 0.8–3.5)
LYMPHOCYTES NFR BLD: 22 % (ref 12–49)
MCH RBC QN AUTO: 28.9 PG (ref 26–34)
MCHC RBC AUTO-ENTMCNC: 32.1 G/DL (ref 30–36.5)
MCV RBC AUTO: 90 FL (ref 80–99)
MONOCYTES # BLD: 0.8 K/UL (ref 0–1)
MONOCYTES NFR BLD: 15 % (ref 5–13)
NEUTS SEG # BLD: 3 K/UL (ref 1.8–8)
NEUTS SEG NFR BLD: 54 % (ref 32–75)
NRBC # BLD: 0 K/UL (ref 0–0.01)
NRBC BLD-RTO: 0 PER 100 WBC
PLATELET # BLD AUTO: 277 K/UL (ref 150–400)
PMV BLD AUTO: 10.1 FL (ref 8.9–12.9)
POTASSIUM SERPL-SCNC: 4.2 MMOL/L (ref 3.5–5.1)
PROT SERPL-MCNC: 7.2 G/DL (ref 6.4–8.2)
RBC # BLD AUTO: 4.4 M/UL (ref 4.1–5.7)
SODIUM SERPL-SCNC: 136 MMOL/L (ref 136–145)
SOURCE, COVRS: NORMAL
WBC # BLD AUTO: 5.5 K/UL (ref 4.1–11.1)

## 2021-11-24 PROCEDURE — 96365 THER/PROPH/DIAG IV INF INIT: CPT

## 2021-11-24 PROCEDURE — 99284 EMERGENCY DEPT VISIT MOD MDM: CPT

## 2021-11-24 PROCEDURE — 71045 X-RAY EXAM CHEST 1 VIEW: CPT

## 2021-11-24 PROCEDURE — 74011250636 HC RX REV CODE- 250/636: Performed by: EMERGENCY MEDICINE

## 2021-11-24 PROCEDURE — 87635 SARS-COV-2 COVID-19 AMP PRB: CPT

## 2021-11-24 PROCEDURE — 96375 TX/PRO/DX INJ NEW DRUG ADDON: CPT

## 2021-11-24 PROCEDURE — 80053 COMPREHEN METABOLIC PANEL: CPT

## 2021-11-24 PROCEDURE — 36415 COLL VENOUS BLD VENIPUNCTURE: CPT

## 2021-11-24 PROCEDURE — 85025 COMPLETE CBC W/AUTO DIFF WBC: CPT

## 2021-11-24 PROCEDURE — 83605 ASSAY OF LACTIC ACID: CPT

## 2021-11-24 PROCEDURE — U0005 INFEC AGEN DETEC AMPLI PROBE: HCPCS

## 2021-11-24 RX ORDER — LEVOFLOXACIN 500 MG/1
500 TABLET, FILM COATED ORAL DAILY
Qty: 7 TABLET | Refills: 0 | Status: SHIPPED | OUTPATIENT
Start: 2021-11-24

## 2021-11-24 RX ORDER — LEVOFLOXACIN 5 MG/ML
500 INJECTION, SOLUTION INTRAVENOUS
Status: COMPLETED | OUTPATIENT
Start: 2021-11-24 | End: 2021-11-24

## 2021-11-24 RX ORDER — DEXAMETHASONE SODIUM PHOSPHATE 4 MG/ML
10 INJECTION, SOLUTION INTRA-ARTICULAR; INTRALESIONAL; INTRAMUSCULAR; INTRAVENOUS; SOFT TISSUE
Status: COMPLETED | OUTPATIENT
Start: 2021-11-24 | End: 2021-11-24

## 2021-11-24 RX ADMIN — LEVOFLOXACIN 500 MG: 5 INJECTION, SOLUTION INTRAVENOUS at 10:01

## 2021-11-24 RX ADMIN — DEXAMETHASONE SODIUM PHOSPHATE 10 MG: 4 INJECTION, SOLUTION INTRAMUSCULAR; INTRAVENOUS at 10:00

## 2021-11-24 NOTE — ED PROVIDER NOTES
EMERGENCY DEPARTMENT HISTORY AND PHYSICAL EXAM      Date: 11/24/2021  Patient Name: Uzma Montanez. Patient Age and Sex: 80 y.o. male     History of Presenting Illness     Chief Complaint   Patient presents with    Wheezing     suspected pneumonia       History Provided By: Patient, ems    HPI: Uzma Montanez. is a 22-year-old male with a history of dementia presenting for pneumonia. According to EMS, patient lives at the [facility] in [place]. They got a chest x-ray on him yesterday which showed a pneumonia and possibility of COVID-19 so sent him here today. Here patient is not complaining of any shortness of breath, nausea, fevers or pain. EMS reported normal vitals in route. They have yet to start him on any antibiotics. There are no other complaints, changes, or physical findings at this time. PCP: Vaibhav Black MD    No current facility-administered medications on file prior to encounter. Current Outpatient Medications on File Prior to Encounter   Medication Sig Dispense Refill    methocarbamoL (Robaxin-750) 750 mg tablet Take 1 Tab by mouth four (4) times daily as needed for Muscle Spasm(s). 20 Tab 0    acetaminophen (TYLENOL) 325 mg tablet Take 2 Tabs by mouth every six (6) hours as needed for Pain or Fever. 40 Tab 0    amLODIPine (NORVASC) 5 mg tablet Take 1 Tab by mouth daily. 30 Tab 1    aspirin delayed-release 81 mg tablet Take 1 Tab by mouth daily. 30 Tab 1    clopidogreL (PLAVIX) 75 mg tab Take 1 Tab by mouth daily. 30 Tab 1    guaiFENesin (ROBITUSSIN) 100 mg/5 mL liquid Take 20 mL by mouth three (3) times daily. 473 mL 0    metoprolol tartrate (LOPRESSOR) 25 mg tablet Take 1 Tab by mouth every twelve (12) hours. 60 Tab 1    QUEtiapine (SEROQUEL) 25 mg tablet Take 1 Tab by mouth nightly.  At 1900 30 Tab 1       Past History     Past Medical History:  Past Medical History:   Diagnosis Date    High blood pressure     High cholesterol     Prostate cancer (HCC)        Past Surgical History:  No past surgical history on file. Family History:  Family History   Problem Relation Age of Onset    Hypertension Mother     Hypertension Father        Social History:  Social History     Tobacco Use    Smoking status: Not on file    Smokeless tobacco: Not on file   Substance Use Topics    Alcohol use: Not on file    Drug use: Not on file       Allergies:  No Known Allergies      Review of Systems   Review of Systems   Unable to perform ROS: Dementia        Physical Exam   Physical Exam  Constitutional:       General: He is not in acute distress. Appearance: He is well-developed. HENT:      Head: Normocephalic and atraumatic. Nose: Nose normal.      Mouth/Throat:      Mouth: Mucous membranes are moist.   Eyes:      Extraocular Movements: Extraocular movements intact. Conjunctiva/sclera: Conjunctivae normal.   Cardiovascular:      Comments: Well perfused  Pulmonary:      Effort: Pulmonary effort is normal. No respiratory distress. Comments: Soft wheezes bilaterally  Musculoskeletal:         General: Normal range of motion. Cervical back: Normal range of motion. Neurological:      General: No focal deficit present. Mental Status: He is alert. Psychiatric:         Mood and Affect: Mood normal.          Diagnostic Study Results     Labs -     Recent Results (from the past 12 hour(s))   CBC WITH AUTOMATED DIFF    Collection Time: 11/24/21  9:30 AM   Result Value Ref Range    WBC 5.5 4.1 - 11.1 K/uL    RBC 4.40 4. 10 - 5.70 M/uL    HGB 12.7 12.1 - 17.0 g/dL    HCT 39.6 36.6 - 50.3 %    MCV 90.0 80.0 - 99.0 FL    MCH 28.9 26.0 - 34.0 PG    MCHC 32.1 30.0 - 36.5 g/dL    RDW 14.5 11.5 - 14.5 %    PLATELET 294 818 - 154 K/uL    MPV 10.1 8.9 - 12.9 FL    NRBC 0.0 0  WBC    ABSOLUTE NRBC 0.00 0.00 - 0.01 K/uL    NEUTROPHILS 54 32 - 75 %    LYMPHOCYTES 22 12 - 49 %    MONOCYTES 15 (H) 5 - 13 %    EOSINOPHILS 7 0 - 7 %    BASOPHILS 2 (H) 0 - 1 %    IMMATURE GRANULOCYTES 0 0.0 - 0.5 %    ABS. NEUTROPHILS 3.0 1.8 - 8.0 K/UL    ABS. LYMPHOCYTES 1.2 0.8 - 3.5 K/UL    ABS. MONOCYTES 0.8 0.0 - 1.0 K/UL    ABS. EOSINOPHILS 0.4 0.0 - 0.4 K/UL    ABS. BASOPHILS 0.1 0.0 - 0.1 K/UL    ABS. IMM. GRANS. 0.0 0.00 - 0.04 K/UL    DF AUTOMATED     METABOLIC PANEL, COMPREHENSIVE    Collection Time: 11/24/21  9:30 AM   Result Value Ref Range    Sodium 136 136 - 145 mmol/L    Potassium 4.2 3.5 - 5.1 mmol/L    Chloride 100 97 - 108 mmol/L    CO2 31 21 - 32 mmol/L    Anion gap 5 5 - 15 mmol/L    Glucose 148 (H) 65 - 100 mg/dL    BUN 17 6 - 20 MG/DL    Creatinine 1.20 0.70 - 1.30 MG/DL    BUN/Creatinine ratio 14 12 - 20      GFR est AA >60 >60 ml/min/1.73m2    GFR est non-AA 57 (L) >60 ml/min/1.73m2    Calcium 8.8 8.5 - 10.1 MG/DL    Bilirubin, total 0.3 0.2 - 1.0 MG/DL    ALT (SGPT) 21 12 - 78 U/L    AST (SGOT) 35 15 - 37 U/L    Alk. phosphatase 131 (H) 45 - 117 U/L    Protein, total 7.2 6.4 - 8.2 g/dL    Albumin 2.6 (L) 3.5 - 5.0 g/dL    Globulin 4.6 (H) 2.0 - 4.0 g/dL    A-G Ratio 0.6 (L) 1.1 - 2.2     COVID-19 RAPID TEST    Collection Time: 11/24/21  9:30 AM   Result Value Ref Range    Specimen source Nasopharyngeal      COVID-19 rapid test Not detected NOTD     POC LACTIC ACID    Collection Time: 11/24/21  9:46 AM   Result Value Ref Range    Lactic Acid (POC) 1.51 0.40 - 2.00 mmol/L       Radiologic Studies -   XR CHEST PORT    (Results Pending)     CT Results  (Last 48 hours)    None        CXR Results  (Last 48 hours)    None            Medical Decision Making   I am the first provider for this patient. I reviewed the vital signs, available nursing notes, past medical history, past surgical history, family history and social history. Vital Signs-Reviewed the patient's vital signs.   Patient Vitals for the past 12 hrs:   Temp Pulse Resp BP SpO2   11/24/21 0923   18     11/24/21 0922 98.5 °F (36.9 °C) (!) 57 18 136/60 94 %       Records Reviewed: Nursing Notes and Old Medical Records    Provider Notes (Medical Decision Making):   Patient presenting for pneumonia at outpatient facility with no complaints here and normal vitals per EMS. Plan will be to get basic labs on him, rapid and PCR Covid test and treat his pneumonia. Currently in no distress so might not need admission. ED Course:   Initial assessment performed. The patients presenting problems have been discussed, and they are in agreement with the care plan formulated and outlined with them. I have encouraged them to ask questions as they arise throughout their visit. Critical Care Time:   0    Disposition:  Discharge Note:  The patient has been re-evaluated and is ready for discharge. Reviewed available results with patient. Counseled patient on diagnosis and care plan. Patient has expressed understanding, and all questions have been answered. Patient agrees with plan and agrees to follow up as recommended, or to return to the ED if their symptoms worsen. Discharge instructions have been provided and explained to the patient, along with reasons to return to the ED. PLAN:  Current Discharge Medication List      START taking these medications    Details   levoFLOXacin (Levaquin) 500 mg tablet Take 1 Tablet by mouth daily. Qty: 7 Tablet, Refills: 0  Start date: 11/24/2021           2. Follow-up Information     Follow up With Specialties Details Why Contact Info    Justino Valadez MD Internal Medicine  As needed Vesna Okeefe 1935 [phone]          3. Return to ED if worse     Diagnosis     Clinical Impression:   1. Community acquired pneumonia, unspecified laterality        Attestations:    Godwin Miner M.D. Please note that this dictation was completed with [employer], the computer voice recognition software. Quite often unanticipated grammatical, syntax, homophones, and other interpretive errors are inadvertently transcribed by the computer software.   Please disregard these errors. Please excuse any errors that have escaped final proofreading. Thank you.

## 2021-11-26 LAB
SARS-COV-2, XPLCVT: NOT DETECTED
SOURCE, COVRS: NORMAL

## 2022-03-18 PROBLEM — J18.9 PNEUMONIA: Status: ACTIVE | Noted: 2020-02-25

## 2022-03-19 PROBLEM — I21.4 NSTEMI (NON-ST ELEVATED MYOCARDIAL INFARCTION) (HCC): Status: ACTIVE | Noted: 2020-02-19

## 2022-03-19 PROBLEM — I10 HTN (HYPERTENSION): Status: ACTIVE | Noted: 2020-02-25

## 2022-03-19 PROBLEM — E78.5 HYPERLIPIDEMIA: Status: ACTIVE | Noted: 2020-02-25

## 2022-03-20 PROBLEM — F03.90 DEMENTIA (HCC): Status: ACTIVE | Noted: 2020-02-25

## 2022-03-20 PROBLEM — M62.82 RHABDOMYOLYSIS: Status: ACTIVE | Noted: 2020-02-25

## 2022-05-07 NOTE — PROGRESS NOTES
CM contacted pt's DTR Sharon Courser, [phone]) to follow-up with SNF preferences from list provided by CM yesterday afternoon. Confidential VM left requesting follow-up today so that referrals can be sent accordingly. Awaiting response at this time. Please note, the patient has [facility] and will require authorization prior to d/c to SNF. The patient will also require more than 1 therapy evaluation for SNF placement in order for [employer] to authorize SNF stay. CM will continue to monitor the patient's chart for completed OT evaluation.     Claude Floras, MSW Supervisee in Social Work, [employer]  [phone] Patient contacted

## 2022-10-06 NOTE — ED NOTES
Allyson Burns reviewed discharge instructions with the patient and daughter. The patient and daughter verbalized understanding. numerical 0-10

## 2023-05-12 RX ORDER — CLOPIDOGREL BISULFATE 75 MG/1
TABLET ORAL DAILY
COMMUNITY
Start: 2020-02-25

## 2023-05-12 RX ORDER — ACETAMINOPHEN 325 MG/1
TABLET ORAL EVERY 6 HOURS PRN
COMMUNITY
Start: 2020-02-25

## 2023-05-12 RX ORDER — LEVOFLOXACIN 500 MG/1
1 TABLET, FILM COATED ORAL DAILY
COMMUNITY
Start: 2021-11-24

## 2023-05-12 RX ORDER — ASPIRIN 81 MG/1
TABLET ORAL DAILY
COMMUNITY
Start: 2020-02-25

## 2023-05-12 RX ORDER — QUETIAPINE FUMARATE 25 MG/1
TABLET, FILM COATED ORAL
COMMUNITY
Start: 2020-02-25

## 2023-05-12 RX ORDER — AMLODIPINE BESYLATE 5 MG/1
TABLET ORAL DAILY
COMMUNITY
Start: 2020-02-25

## 2023-05-12 RX ORDER — GUAIFENESIN 200 MG/10ML
LIQUID ORAL 3 TIMES DAILY
COMMUNITY
Start: 2020-02-25

## 2023-05-12 RX ORDER — METHOCARBAMOL 750 MG/1
TABLET, FILM COATED ORAL 4 TIMES DAILY PRN
COMMUNITY
Start: 2020-08-03

## 2024-01-02 ENCOUNTER — APPOINTMENT (OUTPATIENT)
Facility: HOSPITAL | Age: 89
End: 2024-01-02
Payer: MEDICARE

## 2024-01-02 ENCOUNTER — HOSPITAL ENCOUNTER (INPATIENT)
Facility: HOSPITAL | Age: 89
LOS: 3 days | Discharge: HOME | End: 2024-01-05
Attending: EMERGENCY MEDICINE | Admitting: INTERNAL MEDICINE
Payer: MEDICARE

## 2024-01-02 DIAGNOSIS — E87.70 HYPERVOLEMIA, UNSPECIFIED HYPERVOLEMIA TYPE: ICD-10-CM

## 2024-01-02 DIAGNOSIS — R41.82 ALTERED MENTAL STATUS, UNSPECIFIED ALTERED MENTAL STATUS TYPE: ICD-10-CM

## 2024-01-02 DIAGNOSIS — J96.01 ACUTE RESPIRATORY FAILURE WITH HYPOXIA (HCC): Primary | ICD-10-CM

## 2024-01-02 PROBLEM — I50.43 CHF (CONGESTIVE HEART FAILURE), NYHA CLASS I, ACUTE ON CHRONIC, COMBINED (HCC): Status: ACTIVE | Noted: 2024-01-02

## 2024-01-02 LAB
ALBUMIN SERPL-MCNC: 2.9 G/DL (ref 3.5–5)
ALBUMIN/GLOB SERPL: 0.7 (ref 1.1–2.2)
ALP SERPL-CCNC: 95 U/L (ref 45–117)
ALT SERPL-CCNC: 15 U/L (ref 12–78)
ANION GAP SERPL CALC-SCNC: ABNORMAL MMOL/L (ref 5–15)
APPEARANCE UR: CLEAR
AST SERPL-CCNC: 14 U/L (ref 15–37)
BACTERIA URNS QL MICRO: NEGATIVE /HPF
BASOPHILS # BLD: 0.1 K/UL (ref 0–0.1)
BASOPHILS NFR BLD: 1 % (ref 0–1)
BILIRUB SERPL-MCNC: 0.6 MG/DL (ref 0.2–1)
BILIRUB UR QL: NEGATIVE
BUN SERPL-MCNC: 19 MG/DL (ref 6–20)
BUN/CREAT SERPL: 14 (ref 12–20)
CALCIUM SERPL-MCNC: 9.1 MG/DL (ref 8.5–10.1)
CHLORIDE SERPL-SCNC: 106 MMOL/L (ref 97–108)
CO2 SERPL-SCNC: 31 MMOL/L (ref 21–32)
COLOR UR: ABNORMAL
CREAT SERPL-MCNC: 1.36 MG/DL (ref 0.7–1.3)
DIFFERENTIAL METHOD BLD: ABNORMAL
EOSINOPHIL # BLD: 0.2 K/UL (ref 0–0.4)
EOSINOPHIL NFR BLD: 2 % (ref 0–7)
EPITH CASTS URNS QL MICRO: ABNORMAL /LPF
ERYTHROCYTE [DISTWIDTH] IN BLOOD BY AUTOMATED COUNT: 14.8 % (ref 11.5–14.5)
FLUAV AG NPH QL IA: NEGATIVE
FLUBV AG NOSE QL IA: NEGATIVE
GLOBULIN SER CALC-MCNC: 3.9 G/DL (ref 2–4)
GLUCOSE SERPL-MCNC: 175 MG/DL (ref 65–100)
GLUCOSE UR STRIP.AUTO-MCNC: NEGATIVE MG/DL
HCT VFR BLD AUTO: 37 % (ref 36.6–50.3)
HGB BLD-MCNC: 12.1 G/DL (ref 12.1–17)
HGB UR QL STRIP: NEGATIVE
HYALINE CASTS URNS QL MICRO: ABNORMAL /LPF (ref 0–2)
IMM GRANULOCYTES # BLD AUTO: 0 K/UL (ref 0–0.04)
IMM GRANULOCYTES NFR BLD AUTO: 0 % (ref 0–0.5)
KETONES UR QL STRIP.AUTO: ABNORMAL MG/DL
LEUKOCYTE ESTERASE UR QL STRIP.AUTO: NEGATIVE
LYMPHOCYTES # BLD: 2 K/UL (ref 0.8–3.5)
LYMPHOCYTES NFR BLD: 16 % (ref 12–49)
MAGNESIUM SERPL-MCNC: 1.9 MG/DL (ref 1.6–2.4)
MCH RBC QN AUTO: 30 PG (ref 26–34)
MCHC RBC AUTO-ENTMCNC: 32.7 G/DL (ref 30–36.5)
MCV RBC AUTO: 91.6 FL (ref 80–99)
MONOCYTES # BLD: 1 K/UL (ref 0–1)
MONOCYTES NFR BLD: 8 % (ref 5–13)
NEUTS SEG # BLD: 9.4 K/UL (ref 1.8–8)
NEUTS SEG NFR BLD: 73 % (ref 32–75)
NITRITE UR QL STRIP.AUTO: NEGATIVE
NRBC # BLD: 0 K/UL (ref 0–0.01)
NRBC BLD-RTO: 0 PER 100 WBC
NT PRO BNP: 4509 PG/ML
PH UR STRIP: 6.5 (ref 5–8)
PLATELET # BLD AUTO: 226 K/UL (ref 150–400)
PMV BLD AUTO: 10.7 FL (ref 8.9–12.9)
POTASSIUM SERPL-SCNC: 3.6 MMOL/L (ref 3.5–5.1)
PROT SERPL-MCNC: 6.8 G/DL (ref 6.4–8.2)
PROT UR STRIP-MCNC: NEGATIVE MG/DL
RBC # BLD AUTO: 4.04 M/UL (ref 4.1–5.7)
RBC #/AREA URNS HPF: ABNORMAL /HPF (ref 0–5)
SARS-COV-2 RDRP RESP QL NAA+PROBE: NOT DETECTED
SODIUM SERPL-SCNC: 136 MMOL/L (ref 136–145)
SOURCE: NORMAL
SP GR UR REFRACTOMETRY: 1.01
URINE CULTURE IF INDICATED: ABNORMAL
UROBILINOGEN UR QL STRIP.AUTO: 0.2 EU/DL (ref 0.2–1)
WBC # BLD AUTO: 12.6 K/UL (ref 4.1–11.1)
WBC URNS QL MICRO: ABNORMAL /HPF (ref 0–4)

## 2024-01-02 PROCEDURE — 99285 EMERGENCY DEPT VISIT HI MDM: CPT

## 2024-01-02 PROCEDURE — 6360000002 HC RX W HCPCS: Performed by: INTERNAL MEDICINE

## 2024-01-02 PROCEDURE — 1100000003 HC PRIVATE W/ TELEMETRY

## 2024-01-02 PROCEDURE — 6360000002 HC RX W HCPCS: Performed by: EMERGENCY MEDICINE

## 2024-01-02 PROCEDURE — 81001 URINALYSIS AUTO W/SCOPE: CPT

## 2024-01-02 PROCEDURE — 87804 INFLUENZA ASSAY W/OPTIC: CPT

## 2024-01-02 PROCEDURE — 71045 X-RAY EXAM CHEST 1 VIEW: CPT

## 2024-01-02 PROCEDURE — 2580000003 HC RX 258: Performed by: INTERNAL MEDICINE

## 2024-01-02 PROCEDURE — 87635 SARS-COV-2 COVID-19 AMP PRB: CPT

## 2024-01-02 PROCEDURE — 36415 COLL VENOUS BLD VENIPUNCTURE: CPT

## 2024-01-02 PROCEDURE — 85025 COMPLETE CBC W/AUTO DIFF WBC: CPT

## 2024-01-02 PROCEDURE — 70450 CT HEAD/BRAIN W/O DYE: CPT

## 2024-01-02 PROCEDURE — 83880 ASSAY OF NATRIURETIC PEPTIDE: CPT

## 2024-01-02 PROCEDURE — 93005 ELECTROCARDIOGRAM TRACING: CPT | Performed by: EMERGENCY MEDICINE

## 2024-01-02 PROCEDURE — 83735 ASSAY OF MAGNESIUM: CPT

## 2024-01-02 PROCEDURE — 80053 COMPREHEN METABOLIC PANEL: CPT

## 2024-01-02 RX ORDER — SODIUM CHLORIDE 0.9 % (FLUSH) 0.9 %
5-40 SYRINGE (ML) INJECTION EVERY 12 HOURS SCHEDULED
Status: DISCONTINUED | OUTPATIENT
Start: 2024-01-02 | End: 2024-01-05 | Stop reason: HOSPADM

## 2024-01-02 RX ORDER — AMLODIPINE BESYLATE 5 MG/1
5 TABLET ORAL DAILY
Status: DISCONTINUED | OUTPATIENT
Start: 2024-01-03 | End: 2024-01-05 | Stop reason: HOSPADM

## 2024-01-02 RX ORDER — HALOPERIDOL 5 MG/ML
1 INJECTION INTRAMUSCULAR ONCE
Status: COMPLETED | OUTPATIENT
Start: 2024-01-02 | End: 2024-01-02

## 2024-01-02 RX ORDER — CLOPIDOGREL BISULFATE 75 MG/1
75 TABLET ORAL DAILY
Status: DISCONTINUED | OUTPATIENT
Start: 2024-01-03 | End: 2024-01-05 | Stop reason: HOSPADM

## 2024-01-02 RX ORDER — FUROSEMIDE 10 MG/ML
20 INJECTION INTRAMUSCULAR; INTRAVENOUS ONCE
Status: COMPLETED | OUTPATIENT
Start: 2024-01-02 | End: 2024-01-02

## 2024-01-02 RX ORDER — SODIUM CHLORIDE 9 MG/ML
INJECTION, SOLUTION INTRAVENOUS PRN
Status: DISCONTINUED | OUTPATIENT
Start: 2024-01-02 | End: 2024-01-05 | Stop reason: HOSPADM

## 2024-01-02 RX ORDER — ACETAMINOPHEN 325 MG/1
650 TABLET ORAL EVERY 6 HOURS PRN
Status: DISCONTINUED | OUTPATIENT
Start: 2024-01-02 | End: 2024-01-05 | Stop reason: HOSPADM

## 2024-01-02 RX ORDER — ENOXAPARIN SODIUM 100 MG/ML
40 INJECTION SUBCUTANEOUS DAILY
Status: DISCONTINUED | OUTPATIENT
Start: 2024-01-03 | End: 2024-01-05 | Stop reason: HOSPADM

## 2024-01-02 RX ORDER — ONDANSETRON 4 MG/1
4 TABLET, ORALLY DISINTEGRATING ORAL EVERY 8 HOURS PRN
Status: DISCONTINUED | OUTPATIENT
Start: 2024-01-02 | End: 2024-01-05 | Stop reason: HOSPADM

## 2024-01-02 RX ORDER — QUETIAPINE FUMARATE 25 MG/1
25 TABLET, FILM COATED ORAL NIGHTLY
Status: DISCONTINUED | OUTPATIENT
Start: 2024-01-03 | End: 2024-01-05 | Stop reason: HOSPADM

## 2024-01-02 RX ORDER — ASPIRIN 81 MG/1
81 TABLET ORAL DAILY
Status: DISCONTINUED | OUTPATIENT
Start: 2024-01-03 | End: 2024-01-05 | Stop reason: HOSPADM

## 2024-01-02 RX ORDER — ACETAMINOPHEN 650 MG/1
650 SUPPOSITORY RECTAL EVERY 6 HOURS PRN
Status: DISCONTINUED | OUTPATIENT
Start: 2024-01-02 | End: 2024-01-05 | Stop reason: HOSPADM

## 2024-01-02 RX ORDER — POLYETHYLENE GLYCOL 3350 17 G/17G
17 POWDER, FOR SOLUTION ORAL DAILY PRN
Status: DISCONTINUED | OUTPATIENT
Start: 2024-01-02 | End: 2024-01-05 | Stop reason: HOSPADM

## 2024-01-02 RX ORDER — SODIUM CHLORIDE 0.9 % (FLUSH) 0.9 %
5-40 SYRINGE (ML) INJECTION PRN
Status: DISCONTINUED | OUTPATIENT
Start: 2024-01-02 | End: 2024-01-05 | Stop reason: HOSPADM

## 2024-01-02 RX ORDER — ONDANSETRON 2 MG/ML
4 INJECTION INTRAMUSCULAR; INTRAVENOUS EVERY 6 HOURS PRN
Status: DISCONTINUED | OUTPATIENT
Start: 2024-01-02 | End: 2024-01-05 | Stop reason: HOSPADM

## 2024-01-02 RX ADMIN — FUROSEMIDE 20 MG: 10 INJECTION, SOLUTION INTRAMUSCULAR; INTRAVENOUS at 17:45

## 2024-01-02 RX ADMIN — AZITHROMYCIN MONOHYDRATE 500 MG: 500 INJECTION, POWDER, LYOPHILIZED, FOR SOLUTION INTRAVENOUS at 18:50

## 2024-01-02 RX ADMIN — SODIUM CHLORIDE, PRESERVATIVE FREE 10 ML: 5 INJECTION INTRAVENOUS at 21:55

## 2024-01-02 RX ADMIN — CEFTRIAXONE SODIUM 1000 MG: 1 INJECTION, POWDER, FOR SOLUTION INTRAMUSCULAR; INTRAVENOUS at 18:07

## 2024-01-02 RX ADMIN — HALOPERIDOL LACTATE 1 MG: 5 INJECTION, SOLUTION INTRAMUSCULAR at 17:15

## 2024-01-02 NOTE — H&P
Hospitalist Admission Note    NAME:   Israel Childers Jr.   : 5/10/1929   MRN: [id number]     Date/Time: 2024 4:38 PM    Patient PCP: Jamal Rubio MD    ______________________________________________________________________  Given the patient's current clinical presentation, I have a high level of concern for decompensation if discharged from the emergency department.  Complex decision making was performed, which includes reviewing the patient's available past medical records, laboratory results, and x-ray films.       My assessment of this patient's clinical condition and my plan of care is as follows.    Assessment / Plan:    Acute hypoxic respiratory failure  CHF  Possible pneumonia    Admit patient to med telemetry  Follow-up procalcitonin  Echocardiogram  Start empiric IV antibiotic Rocephin azithromycin  Start lasix    Change mental status likely secondary to metabolic encephalopathy    Head CT scan showed no acute abnormality  Continue monitoring    Hypertension  Continue home antihypertensive medication    CAD  Continue home medication        Medical Decision Making:   I personally reviewed labs: CBC, BMP  I personally reviewed imaging: Chest x-ray  I personally reviewed EKG: Yes  Toxic drug monitoring:   Discussed case with: ED provider. After discussion I am in agreement that acuity of patient's medical condition necessitates hospital stay.      Code Status: Full  DVT Prophylaxis: Lovenox  Baseline:     Subjective:   CHIEF COMPLAINT: Shortness of breath    HISTORY OF PRESENT ILLNESS:     Israel Childers is a 89 y.o.  male with PMHx significant for hypertension, CAD, history of prostate cancer, hypercholesterolemia presented to the hospital for evaluation of chest muscle status associated with Started about couple days ago, patient denying chest pain, blood work was done was significant for elevated white blood cell count 12.6, chest x-ray was done showing moderate edema.    We were asked to admit for

## 2024-01-02 NOTE — ED PROVIDER NOTES
in the window for code stroke, doubt this, no focal deficits.  Will check electrolytes and urinalysis.    Amount and/or Complexity of Data Reviewed  Independent Historian: EMS  External Data Reviewed: notes.     Details: SNF MAR  Labs: ordered. Decision-making details documented in ED Course.  Radiology: ordered and independent interpretation performed. Decision-making details documented in ED Course.  ECG/medicine tests: ordered and independent interpretation performed. Decision-making details documented in ED Course.    Risk  Prescription drug management.  Decision regarding hospitalization.          ED Course as of 01/02/24 2219 Tue Jan 02, 2024   1312 EKG interpreted by me.  Shows undetermined rhythm with a HR of 87.  No ST elevations, LVH with strain pattern.     [MB]   1338 CXR with interstitial prominence, fairly unchanged from prior as interpreted by me.    Notified by nursing patient 88% on RA, placed on 2L. [MB]      ED Course User Index  [MB] Viral Eng MD     Updates as above, CBC with mild leukocytosis, CMP unremarkable.  BNP is elevated, with chest x-ray showing evidence of pulmonary edema as interpreted by radiology, will give Lasix.  Lower suspicion for pneumonia at this time.  Viral swabs are negative.  Consult with hospitalist, Dr. Kaminski, for admission.        FINAL IMPRESSION     1. Acute respiratory failure with hypoxia (HCC)    2. Hypervolemia, unspecified hypervolemia type    3. Altered mental status, unspecified altered mental status type          DISPOSITION/PLAN   Israel Childers Jr.'s  results have been reviewed with him.  He has been counseled regarding his diagnosis, treatment, and plan.  He verbally conveys understanding and agreement of the signs, symptoms, diagnosis, treatment and prognosis and additionally agrees to follow up as discussed.  He also agrees with the care-plan and conveys that all of his questions have been answered.  I have also provided discharge instructions  for him that include: educational information regarding their diagnosis and treatment, and list of reasons why they would want to return to the ED prior to their follow-up appointment, should his condition change.     CLINICAL IMPRESSION    DISPOSITION Admitted 01/02/2024 03:40:31 PM    I am the Primary Clinician of Record.   Viral Eng MD (electronically signed)    (Please note that parts of this dictation were completed with voice recognition software. Quite often unanticipated grammatical, syntax, homophones, and other interpretive errors are inadvertently transcribed by the computer software. Please disregards these errors. Please excuse any errors that have escaped final proofreading.)         Viral Eng MD  01/02/24 8625

## 2024-01-02 NOTE — ED TRIAGE NOTES
Pt to er via ems from nursing facility for c/o ams and cough.   Per ems nursing staff were concerned pt might have flu or covid because its going around facility. Upon arrival pt has congested sounding cough, hypoxic upper 80s on room air.   Pt has hx of dimentia.
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
## 2024-01-03 ENCOUNTER — APPOINTMENT (OUTPATIENT)
Facility: HOSPITAL | Age: 89
End: 2024-01-03
Attending: INTERNAL MEDICINE
Payer: MEDICARE

## 2024-01-03 PROBLEM — R41.0 CONFUSION: Status: ACTIVE | Noted: 2024-01-03

## 2024-01-03 PROBLEM — R53.1 GENERALIZED WEAKNESS: Status: ACTIVE | Noted: 2024-01-03

## 2024-01-03 PROBLEM — Z71.89 DNR (DO NOT RESUSCITATE) DISCUSSION: Status: ACTIVE | Noted: 2024-01-03

## 2024-01-03 PROBLEM — Z51.5 PALLIATIVE CARE BY SPECIALIST: Status: ACTIVE | Noted: 2024-01-03

## 2024-01-03 PROBLEM — R41.89 IMPAIRED COGNITIVE ABILITY: Status: ACTIVE | Noted: 2024-01-03

## 2024-01-03 PROBLEM — R54 AGE-RELATED PHYSICAL DEBILITY: Status: ACTIVE | Noted: 2024-01-03

## 2024-01-03 PROBLEM — Z71.89 COUNSELING REGARDING ADVANCE CARE PLANNING AND GOALS OF CARE: Status: ACTIVE | Noted: 2024-01-03

## 2024-01-03 LAB
ANION GAP SERPL CALC-SCNC: 2 MMOL/L (ref 5–15)
BASOPHILS # BLD: 0.1 K/UL (ref 0–0.1)
BASOPHILS NFR BLD: 1 % (ref 0–1)
BUN SERPL-MCNC: 16 MG/DL (ref 6–20)
BUN/CREAT SERPL: 12 (ref 12–20)
CALCIUM SERPL-MCNC: 8.7 MG/DL (ref 8.5–10.1)
CHLORIDE SERPL-SCNC: 106 MMOL/L (ref 97–108)
CO2 SERPL-SCNC: 34 MMOL/L (ref 21–32)
CREAT SERPL-MCNC: 1.31 MG/DL (ref 0.7–1.3)
DIFFERENTIAL METHOD BLD: ABNORMAL
ECHO AO ROOT DIAM: 3.6 CM
ECHO AO ROOT INDEX: 1.77 CM/M2
ECHO AV PEAK GRADIENT: 7 MMHG
ECHO AV PEAK VELOCITY: 1.3 M/S
ECHO AV VELOCITY RATIO: 0.92
ECHO BSA: 2.03 M2
ECHO EST RA PRESSURE: 8 MMHG
ECHO LA DIAMETER INDEX: 2.27 CM/M2
ECHO LA DIAMETER: 4.6 CM
ECHO LA TO AORTIC ROOT RATIO: 1.28
ECHO LA VOL A-L A2C: 58 ML (ref 18–58)
ECHO LA VOL A-L A4C: 75 ML (ref 18–58)
ECHO LA VOL MOD A2C: 54 ML (ref 18–58)
ECHO LA VOL MOD A4C: 69 ML (ref 18–58)
ECHO LA VOLUME AREA LENGTH: 71 ML
ECHO LA VOLUME INDEX A-L A2C: 29 ML/M2 (ref 16–34)
ECHO LA VOLUME INDEX A-L A4C: 37 ML/M2 (ref 16–34)
ECHO LA VOLUME INDEX AREA LENGTH: 35 ML/M2 (ref 16–34)
ECHO LA VOLUME INDEX MOD A2C: 27 ML/M2 (ref 16–34)
ECHO LA VOLUME INDEX MOD A4C: 34 ML/M2 (ref 16–34)
ECHO LV E' SEPTAL VELOCITY: 6 CM/S
ECHO LV EDV A4C: 130 ML
ECHO LV EDV INDEX A4C: 64 ML/M2
ECHO LV EJECTION FRACTION A4C: 63 %
ECHO LV ESV A4C: 48 ML
ECHO LV ESV INDEX A4C: 24 ML/M2
ECHO LV FRACTIONAL SHORTENING: 33 % (ref 28–44)
ECHO LV INTERNAL DIMENSION DIASTOLE INDEX: 1.92 CM/M2
ECHO LV INTERNAL DIMENSION DIASTOLIC: 3.9 CM (ref 4.2–5.9)
ECHO LV INTERNAL DIMENSION SYSTOLIC INDEX: 1.28 CM/M2
ECHO LV INTERNAL DIMENSION SYSTOLIC: 2.6 CM
ECHO LV IVSD: 1.4 CM (ref 0.6–1)
ECHO LV MASS 2D: 201.5 G (ref 88–224)
ECHO LV MASS INDEX 2D: 99.3 G/M2 (ref 49–115)
ECHO LV POSTERIOR WALL DIASTOLIC: 1.4 CM (ref 0.6–1)
ECHO LV RELATIVE WALL THICKNESS RATIO: 0.72
ECHO LVOT PEAK GRADIENT: 5 MMHG
ECHO LVOT PEAK VELOCITY: 1.2 M/S
ECHO RA AREA 4C: 23 CM2
ECHO RA VOLUME: 67 ML
ECHO RA VOLUME: 70 ML
ECHO RIGHT VENTRICULAR SYSTOLIC PRESSURE (RVSP): 38 MMHG
ECHO RV TAPSE: 2.4 CM (ref 1.7–?)
ECHO TV REGURGITANT MAX VELOCITY: 2.72 M/S
ECHO TV REGURGITANT PEAK GRADIENT: 30 MMHG
EOSINOPHIL # BLD: 0.2 K/UL (ref 0–0.4)
EOSINOPHIL NFR BLD: 2 % (ref 0–7)
ERYTHROCYTE [DISTWIDTH] IN BLOOD BY AUTOMATED COUNT: 14.8 % (ref 11.5–14.5)
GLUCOSE SERPL-MCNC: 101 MG/DL (ref 65–100)
HCT VFR BLD AUTO: 34.7 % (ref 36.6–50.3)
HGB BLD-MCNC: 11.3 G/DL (ref 12.1–17)
IMM GRANULOCYTES # BLD AUTO: 0 K/UL (ref 0–0.04)
IMM GRANULOCYTES NFR BLD AUTO: 0 % (ref 0–0.5)
LYMPHOCYTES # BLD: 1.1 K/UL (ref 0.8–3.5)
LYMPHOCYTES NFR BLD: 11 % (ref 12–49)
MCH RBC QN AUTO: 29.9 PG (ref 26–34)
MCHC RBC AUTO-ENTMCNC: 32.6 G/DL (ref 30–36.5)
MCV RBC AUTO: 91.8 FL (ref 80–99)
MONOCYTES # BLD: 1 K/UL (ref 0–1)
MONOCYTES NFR BLD: 10 % (ref 5–13)
NEUTS SEG # BLD: 7.8 K/UL (ref 1.8–8)
NEUTS SEG NFR BLD: 76 % (ref 32–75)
NRBC # BLD: 0 K/UL (ref 0–0.01)
NRBC BLD-RTO: 0 PER 100 WBC
PLATELET # BLD AUTO: 228 K/UL (ref 150–400)
PMV BLD AUTO: 11 FL (ref 8.9–12.9)
POTASSIUM SERPL-SCNC: 3.8 MMOL/L (ref 3.5–5.1)
RBC # BLD AUTO: 3.78 M/UL (ref 4.1–5.7)
SODIUM SERPL-SCNC: 142 MMOL/L (ref 136–145)
WBC # BLD AUTO: 10.1 K/UL (ref 4.1–11.1)

## 2024-01-03 PROCEDURE — 85025 COMPLETE CBC W/AUTO DIFF WBC: CPT

## 2024-01-03 PROCEDURE — 93306 TTE W/DOPPLER COMPLETE: CPT

## 2024-01-03 PROCEDURE — 6370000000 HC RX 637 (ALT 250 FOR IP): Performed by: INTERNAL MEDICINE

## 2024-01-03 PROCEDURE — 6360000002 HC RX W HCPCS: Performed by: INTERNAL MEDICINE

## 2024-01-03 PROCEDURE — 36415 COLL VENOUS BLD VENIPUNCTURE: CPT

## 2024-01-03 PROCEDURE — 2580000003 HC RX 258: Performed by: INTERNAL MEDICINE

## 2024-01-03 PROCEDURE — 99222 1ST HOSP IP/OBS MODERATE 55: CPT | Performed by: FAMILY MEDICINE

## 2024-01-03 PROCEDURE — 1100000003 HC PRIVATE W/ TELEMETRY

## 2024-01-03 PROCEDURE — 80048 BASIC METABOLIC PNL TOTAL CA: CPT

## 2024-01-03 RX ORDER — POTASSIUM CHLORIDE 750 MG/1
20 TABLET, FILM COATED, EXTENDED RELEASE ORAL DAILY
Status: DISCONTINUED | OUTPATIENT
Start: 2024-01-03 | End: 2024-01-05 | Stop reason: HOSPADM

## 2024-01-03 RX ORDER — FUROSEMIDE 10 MG/ML
20 INJECTION INTRAMUSCULAR; INTRAVENOUS 2 TIMES DAILY
Status: DISCONTINUED | OUTPATIENT
Start: 2024-01-03 | End: 2024-01-05 | Stop reason: HOSPADM

## 2024-01-03 RX ORDER — HYDRALAZINE HYDROCHLORIDE 20 MG/ML
10 INJECTION INTRAMUSCULAR; INTRAVENOUS EVERY 6 HOURS PRN
Status: DISCONTINUED | OUTPATIENT
Start: 2024-01-03 | End: 2024-01-05 | Stop reason: HOSPADM

## 2024-01-03 RX ADMIN — FUROSEMIDE 20 MG: 10 INJECTION, SOLUTION INTRAMUSCULAR; INTRAVENOUS at 11:46

## 2024-01-03 RX ADMIN — FUROSEMIDE 20 MG: 10 INJECTION, SOLUTION INTRAMUSCULAR; INTRAVENOUS at 17:57

## 2024-01-03 RX ADMIN — SODIUM CHLORIDE 5 ML/HR: 9 INJECTION, SOLUTION INTRAVENOUS at 09:30

## 2024-01-03 RX ADMIN — METOPROLOL TARTRATE 25 MG: 25 TABLET, FILM COATED ORAL at 11:46

## 2024-01-03 RX ADMIN — CEFTRIAXONE SODIUM 1000 MG: 1 INJECTION, POWDER, FOR SOLUTION INTRAMUSCULAR; INTRAVENOUS at 09:31

## 2024-01-03 RX ADMIN — POTASSIUM CHLORIDE 20 MEQ: 750 TABLET, FILM COATED, EXTENDED RELEASE ORAL at 11:47

## 2024-01-03 RX ADMIN — ASPIRIN 81 MG: 81 TABLET, COATED ORAL at 09:32

## 2024-01-03 RX ADMIN — QUETIAPINE FUMARATE 25 MG: 25 TABLET ORAL at 19:28

## 2024-01-03 RX ADMIN — ENOXAPARIN SODIUM 40 MG: 100 INJECTION SUBCUTANEOUS at 09:32

## 2024-01-03 RX ADMIN — AMLODIPINE BESYLATE 5 MG: 5 TABLET ORAL at 09:32

## 2024-01-03 RX ADMIN — SODIUM CHLORIDE, PRESERVATIVE FREE 10 ML: 5 INJECTION INTRAVENOUS at 19:29

## 2024-01-03 RX ADMIN — HYDRALAZINE HYDROCHLORIDE 10 MG: 20 INJECTION, SOLUTION INTRAMUSCULAR; INTRAVENOUS at 19:28

## 2024-01-03 RX ADMIN — ACETAMINOPHEN 650 MG: 325 TABLET ORAL at 12:21

## 2024-01-03 RX ADMIN — CLOPIDOGREL BISULFATE 75 MG: 75 TABLET ORAL at 09:32

## 2024-01-03 RX ADMIN — QUETIAPINE FUMARATE 25 MG: 25 TABLET ORAL at 00:13

## 2024-01-03 RX ADMIN — SODIUM CHLORIDE, PRESERVATIVE FREE 10 ML: 5 INJECTION INTRAVENOUS at 09:32

## 2024-01-03 RX ADMIN — AZITHROMYCIN MONOHYDRATE 500 MG: 500 INJECTION, POWDER, LYOPHILIZED, FOR SOLUTION INTRAVENOUS at 10:06

## 2024-01-03 RX ADMIN — METOPROLOL TARTRATE 25 MG: 25 TABLET, FILM COATED ORAL at 00:13

## 2024-01-03 NOTE — PLAN OF CARE
Problem: Safety - Medical Restraint  Goal: Remains free of injury from restraints (Restraint for Interference with Medical Device)  Description: INTERVENTIONS:  1. Determine that other, less restrictive measures have been tried or would not be effective before applying the restraint  2. Evaluate the patient's condition at the time of restraint application  3. Inform patient/family regarding the reason for restraint  4. Q2H: Monitor safety, psychosocial status, comfort, nutrition and hydration  Outcome: Progressing  Flowsheets (Taken 1/2/2024 2000)  Remains free of injury from restraints (restraint for interference with medical device):   Determine that other, less restrictive measures have been tried or would not be effective before applying the restraint   Evaluate the patient's condition at the time of restraint application   Inform patient/family regarding the reason for restraint   Every 2 hours: Monitor safety, psychosocial status, comfort, nutrition and hydration

## 2024-01-03 NOTE — PROGRESS NOTES
[facility]  CLINICAL NURSE SPECIALIST CONSULT    In for nursing evaluation of restraints.     Initial Presentation   Israel Childers Jr. is a 89 y.o. male admitted after experiencing shortness of breath.  On this visit, he is asleep but easily arouses to his name. Restraints on bilat wrists were present.     HX:  Past Medical History:   Diagnosis Date    High blood pressure     High cholesterol     Prostate cancer (HCC)         INITIAL DX: Acute respiratory failure with hypoxia ([facility]) [J96.01]  CHF (congestive heart failure), NYHA class I, acute on chronic, combined ([facility]) [I50.43]  Altered mental status, unspecified altered mental status type [R41.82]  Hypervolemia, unspecified hypervolemia type [E87.70]     Nursing Evaluation and Recommendations   Based on the following assessments are these nursing diagnoses & plan  .   CHRISS-I Nursing Dx   Acute Confusion r/t delirium   Nursing interventions  Checks indicate need for consideration   [x] Lights on & window blinds raised during the day hours  [x] Bed safety measures including bed at lowest setting and bed rails raised  [] Offer oral fluids, e.g., water, every hour while awake  [] Supervise meal consumption  [x] Ambulate to chair for meals  [x] Allow Ambulate in room  [] Ambulate in [place] at least twice daily  []                                                                                  Referral   [] Behavioral health  [x] Case management  [x] Pharmacy for re-timing or re-dosing of medications   []                                                                   Additional recommendations  [x] continue   [x] Consider discontinue of restraint                     Nursing Assessment: Subjective   ”I'm fine.”   Denies pain the chest, arms and legs.       The following was described with help from:  Pt-described Sleep schedule:   Wake - \"early\"  Sleep - \"early\"    Appetite: eats well     Hobbies: Using herbs and vitamins - was

## 2024-01-03 NOTE — PROGRESS NOTES
Hospitalist Progress Note    NAME: Israel Childers Jr.   : 5/10/1929   MRN: [id number]     Date/Time: 1/3/2024 11:06 AM  Patient PCP: Jamal Rubio MD    Estimated discharge date:    Anticipated Disposition / Barriers:      Assessment / Plan:     CHF vs PNA  Hypoxemia, resolved  -transient RA sat 89% yesterday, has been 96-98% since   -CXR Moderate edema pattern. No consolidative pneumonia. Chronic findings as above   -NTproBNP 4500  -COVID and influenza NEG  -likely more CHF than PNA.  Continue empiric abx, transtion to PO at discharge  -start IV lasix BID.  Plan DC on po lasix   -Echo pending  -DC planning for tomorrow if Echo okay, remains off oxygen     AMS / encephalopathy  Dementia  -initial AMS likely from acute illness, transient hypoxemia and  from change in environment  -CT head nothing acute.    -mood stable today  -continue  but will remove mittens  -continue seroquel at HS  -hopefully can return to [facility] tomorrow    HTN  CAD  -continue PTA meds  -IV hydralazine prn      Medical Decision Making:   I personally reviewed labs:  CBC, BMP  I personally reviewed imaging:  Toxic drug monitoring:   Discussed case with: , lead RN during today's interdisciplinary rounds.    Central Line:      Code status: DNR  Prophylaxis: Lovenox    Subjective:     Chief Complaint / Reason for Physician Visit  Follow up of CHF vs PNA, dementia  Discussed with RN events overnight.   Daughter at bedside    Objective:     VITALS:   Most recent are:  Visit Vitals  BP (!) 150/99   Pulse 68   Temp 97.7 °F (36.5 °C) (Axillary)   Resp 14   Ht 1.829 m (6' 0.01\")   Wt 81.2 kg (179 lb)   SpO2 98%   BMI 24.27 kg/m²       I had a face to face encounter and independently examined this patient on 1/3/2024, as outlined below:  PHYSICAL EXAM:  General: WD, WN. Alert, cooperative, no acute distress    EENT:  EOMI. Anicteric sclerae. MMM  Resp:  CTA bilaterally, no wheezing or rales.  No accessory

## 2024-01-03 NOTE — CONSULTS
Palliative Medicine  Patient Name: Israel Childers Jr.  YOB: 1929  MRN: [id number]  Age: 89 y.o.  Gender: male    Date of Initial Consult: 1/3/2023  Date of Service: 1/3/2024  Time: 12:43 PM  Provider: Tianna Smith MD  Hospital Day: 2  Admit Date: 2024  Referring Provider: Dr. Ferrari       Reasons for Consultation:  Goals of Care and End Stage Disease    HISTORY OF PRESENT ILLNESS (HPI):   Israel Childers Jr. is a 89 y.o. male with dementia, HTN, CAD who was admitted on 2024 from [facility] with a diagnosis of CHF vs possible pneumonia. CXR showed moderate edema but did not show clear pneumonia. NT proBNP elevated at 4500. Patient initiated on IV lasix and will likely require OP lasix. Rapid flu and COVID-19 tests negative. Echo results pending. Patient has experienced AMS with restlessness likely related to acute medical issues with hospitalization in the setting of dementia.    Psychosocial: Patient's wife  on hospice when she was 73 from complications of dementia and stroke. They were  on  when wife was 19 years old. They have three children, Flavia Hidalgo, Natalia Gregorio, Yoni Childers. 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 He has been living at the [facility] in [facility] since  after experiencing a fall in February of that year. Flavia reports her dad as adjusted well there. Flavia reports her dad is typically interactive and conversational and that some days it is hard to tell he has dementia. She says he is able to ambulate unassisted within his room to go to the bathroom at the [facility].    PALLIATIVE DIAGNOSES:    Encounter for Palliative Care  Goals of Care discussion  Advance Care Plan discussion  Code Status discussion  Impaired cognitive ability, Dementia, Confusion, Delirium  Physical Debility, Generalized weakness    ASSESSMENT AND PLAN:   Chart reviewed as summarized above. Today I am evaluating patient for GOC related to hospitalization for suspected  Hospice       ADVANCE CARE PLANNING:   [] The [facility] Interdisciplinary Team has updated the ACP Navigator with Health Care Decision Maker and Patient Capacity           Current Code Status: DNR     Goals of Care:         Please refer to Palliative Medicine ACP notes for further details.    PALLIATIVE ASSESSMENT:      Palliative Performance Scale (PPS):  PPS: 40    ECOG:   ECOG Status : Completely disabled [4]    Modified ESAS:  Modified-[place] Symptom Assessment Scale (ESAS)  Pain Score:  (patient cannot answer ROS/ESAS questions d/t mentation)  Dyspnea Score:  (patient cannot answer ROS/ESAS questions d/t mentation)    Clinical Pain Assessment (nonverbal scale for severity on nonverbal patients):           NVPS:  Adult Nonverbal Pain Scale (NVPS)  Face: No particular expression or smile  Activity (Movement): Seeks attention through movement, slow cautious movements  Guarding: Lying quietly, no positioning of hands over areas of bod    RDOS:  RDOS  Restlessness:non-purposeful: Occasional, slight movements  Look of fear: None  Total : 1      Vital Signs: Blood pressure (!) 158/64, pulse 65, temperature 98.1 °F (36.7 °C), resp. rate 17, height 1.829 m (6' 0.01\"), weight 81.2 kg (179 lb), SpO2 (!) 80 %.    PHYSICAL ASSESSMENT:   General: [] Oriented x3  [] Well appearing  [] Intubated  []Ill appearing  [x]Other: lying in hospital bed with eyes closed, appears elderly and frail, mumbles, mildly restless  Mental Status: [] Normal mental status exam  [] Drowsy  [] Confused  []Other:  Cardiovascular: [] Regular rate/rhythm  [] Arrhythmia  [] Other:  Chest: [] Effort normal  []Lungs clear  [] Respiratory distress  []Tachypnea  [] Other:  Abdomen: [] Soft/non-tender  [] Normal appearance  [] Distended  [] Ascites  [] Other:  Neurological: [] Normal speech  [] Normal sensation  []Deficits present:  Extremity: [] Normal skin color/temp  [] Clubbing/cyanosis  [] No edema  [] Other:    Wt Readings from Last 15 Encounters:

## 2024-01-03 NOTE — PLAN OF CARE
Problem: Safety - Medical Restraint  Goal: Remains free of injury from restraints (Restraint for Interference with Medical Device)  Description: INTERVENTIONS:  1. Determine that other, less restrictive measures have been tried or would not be effective before applying the restraint  2. Evaluate the patient's condition at the time of restraint application  3. Inform patient/family regarding the reason for restraint  4. Q2H: Monitor safety, psychosocial status, comfort, nutrition and hydration  1/3/2024 1104 by Kyara Toure, RN  Outcome: Progressing  1/3/2024 0702 by Johanne Montenegro RN  Outcome: Progressing  1/3/2024 0556 by Cristiana Downs, RN  Outcome: Progressing  Flowsheets (Taken 1/2/2024 2000)  Remains free of injury from restraints (restraint for interference with medical device):   Determine that other, less restrictive measures have been tried or would not be effective before applying the restraint   Evaluate the patient's condition at the time of restraint application   Inform patient/family regarding the reason for restraint   Every 2 hours: Monitor safety, psychosocial status, comfort, nutrition and hydration     Problem: Safety - Adult  Goal: Free from fall injury  Outcome: Progressing     Problem: Discharge Planning  Goal: Discharge to home or other facility with appropriate resources  Outcome: Progressing  Flowsheets (Taken 1/2/2024 2145 by Cristiana Downs, RN)  Discharge to home or other facility with appropriate resources: Identify barriers to discharge with patient and caregiver     Problem: Chronic Conditions and Co-morbidities  Goal: Patient's chronic conditions and co-morbidity symptoms are monitored and maintained or improved  Outcome: Progressing

## 2024-01-03 NOTE — PLAN OF CARE
Problem: Safety - Medical Restraint  Goal: Remains free of injury from restraints (Restraint for Interference with Medical Device)  Description: INTERVENTIONS:  1. Determine that other, less restrictive measures have been tried or would not be effective before applying the restraint  2. Evaluate the patient's condition at the time of restraint application  3. Inform patient/family regarding the reason for restraint  4. Q2H: Monitor safety, psychosocial status, comfort, nutrition and hydration  1/3/2024 1816 by Kyara Toure RN  Outcome: Progressing  1/3/2024 1104 by Kyara Toure RN  Outcome: Progressing  1/3/2024 0702 by Johanne Montenegro RN  Outcome: Progressing  1/3/2024 0556 by Cristiana Downs RN  Outcome: Progressing  Flowsheets (Taken 1/2/2024 2000)  Remains free of injury from restraints (restraint for interference with medical device):   Determine that other, less restrictive measures have been tried or would not be effective before applying the restraint   Evaluate the patient's condition at the time of restraint application   Inform patient/family regarding the reason for restraint   Every 2 hours: Monitor safety, psychosocial status, comfort, nutrition and hydration     Problem: Safety - Adult  Goal: Free from fall injury  1/3/2024 1816 by Kyara Toure RN  Outcome: Progressing  Flowsheets (Taken 1/3/2024 1109)  Free From Fall Injury: Instruct family/caregiver on patient safety  1/3/2024 1104 by Kyara Toure RN  Outcome: Progressing     Problem: Discharge Planning  Goal: Discharge to home or other facility with appropriate resources  1/3/2024 1816 by Kyara Toure RN  Outcome: Progressing  1/3/2024 1104 by Kyara Toure RN  Outcome: Progressing  Flowsheets  Taken 1/3/2024 0722 by Kyara Toure RN  Discharge to home or other facility with appropriate resources: Identify barriers to discharge with patient and caregiver  Taken 1/2/2024 2145 by Himanshu
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
## 2024-01-04 LAB
ANION GAP SERPL CALC-SCNC: 8 MMOL/L (ref 5–15)
BUN SERPL-MCNC: 20 MG/DL (ref 6–20)
BUN/CREAT SERPL: 14 (ref 12–20)
CALCIUM SERPL-MCNC: 8.8 MG/DL (ref 8.5–10.1)
CHLORIDE SERPL-SCNC: 103 MMOL/L (ref 97–108)
CO2 SERPL-SCNC: 30 MMOL/L (ref 21–32)
CREAT SERPL-MCNC: 1.47 MG/DL (ref 0.7–1.3)
EKG ATRIAL RATE: 88 BPM
EKG DIAGNOSIS: NORMAL
EKG Q-T INTERVAL: 414 MS
EKG QRS DURATION: 128 MS
EKG QTC CALCULATION (BAZETT): 498 MS
EKG R AXIS: -55 DEGREES
EKG T AXIS: 62 DEGREES
EKG VENTRICULAR RATE: 87 BPM
GLUCOSE SERPL-MCNC: 106 MG/DL (ref 65–100)
MAGNESIUM SERPL-MCNC: 2.1 MG/DL (ref 1.6–2.4)
NT PRO BNP: 5530 PG/ML
POTASSIUM SERPL-SCNC: 3.2 MMOL/L (ref 3.5–5.1)
SODIUM SERPL-SCNC: 141 MMOL/L (ref 136–145)

## 2024-01-04 PROCEDURE — 6360000002 HC RX W HCPCS: Performed by: INTERNAL MEDICINE

## 2024-01-04 PROCEDURE — 2580000003 HC RX 258: Performed by: INTERNAL MEDICINE

## 2024-01-04 PROCEDURE — 6370000000 HC RX 637 (ALT 250 FOR IP): Performed by: INTERNAL MEDICINE

## 2024-01-04 PROCEDURE — 97535 SELF CARE MNGMENT TRAINING: CPT

## 2024-01-04 PROCEDURE — 1100000003 HC PRIVATE W/ TELEMETRY

## 2024-01-04 PROCEDURE — 83735 ASSAY OF MAGNESIUM: CPT

## 2024-01-04 PROCEDURE — 83880 ASSAY OF NATRIURETIC PEPTIDE: CPT

## 2024-01-04 PROCEDURE — 99231 SBSQ HOSP IP/OBS SF/LOW 25: CPT | Performed by: FAMILY MEDICINE

## 2024-01-04 PROCEDURE — 80048 BASIC METABOLIC PNL TOTAL CA: CPT

## 2024-01-04 PROCEDURE — 97116 GAIT TRAINING THERAPY: CPT

## 2024-01-04 PROCEDURE — 97166 OT EVAL MOD COMPLEX 45 MIN: CPT

## 2024-01-04 PROCEDURE — 36415 COLL VENOUS BLD VENIPUNCTURE: CPT

## 2024-01-04 PROCEDURE — 97162 PT EVAL MOD COMPLEX 30 MIN: CPT

## 2024-01-04 RX ORDER — QUETIAPINE FUMARATE 25 MG/1
25 TABLET, FILM COATED ORAL ONCE
Status: DISCONTINUED | OUTPATIENT
Start: 2024-01-05 | End: 2024-01-05

## 2024-01-04 RX ADMIN — QUETIAPINE FUMARATE 25 MG: 25 TABLET ORAL at 18:34

## 2024-01-04 RX ADMIN — POTASSIUM CHLORIDE 20 MEQ: 750 TABLET, FILM COATED, EXTENDED RELEASE ORAL at 09:57

## 2024-01-04 RX ADMIN — ASPIRIN 81 MG: 81 TABLET, COATED ORAL at 09:57

## 2024-01-04 RX ADMIN — ENOXAPARIN SODIUM 40 MG: 100 INJECTION SUBCUTANEOUS at 09:57

## 2024-01-04 RX ADMIN — CLOPIDOGREL BISULFATE 75 MG: 75 TABLET ORAL at 09:57

## 2024-01-04 RX ADMIN — METOPROLOL TARTRATE 25 MG: 25 TABLET, FILM COATED ORAL at 02:14

## 2024-01-04 RX ADMIN — FUROSEMIDE 20 MG: 10 INJECTION, SOLUTION INTRAMUSCULAR; INTRAVENOUS at 09:57

## 2024-01-04 RX ADMIN — AZITHROMYCIN MONOHYDRATE 500 MG: 500 INJECTION, POWDER, LYOPHILIZED, FOR SOLUTION INTRAVENOUS at 11:15

## 2024-01-04 RX ADMIN — FUROSEMIDE 20 MG: 10 INJECTION, SOLUTION INTRAMUSCULAR; INTRAVENOUS at 18:36

## 2024-01-04 RX ADMIN — SODIUM CHLORIDE, PRESERVATIVE FREE 10 ML: 5 INJECTION INTRAVENOUS at 09:58

## 2024-01-04 RX ADMIN — CEFTRIAXONE SODIUM 1000 MG: 1 INJECTION, POWDER, FOR SOLUTION INTRAMUSCULAR; INTRAVENOUS at 10:13

## 2024-01-04 RX ADMIN — AMLODIPINE BESYLATE 5 MG: 5 TABLET ORAL at 09:58

## 2024-01-04 NOTE — CARE COORDINATION
CM left a msg with pt's daughter, Flavia, requesting a call back so initial assessment can be completed.      Raina Pearl LMSW  Supervisee in Social Work  Care Management, [facility]  [phone]

## 2024-01-04 NOTE — PROGRESS NOTES
Hospitalist Progress Note    NAME: Israel Childers Jr.   : 5/10/1929   MRN: [id number]     Date/Time: 2024 12:40 PM  Patient PCP: Jamal Rubio MD    Estimated discharge date:    Anticipated Disposition / Barriers:      Assessment / Plan:     CHF vs PNA  Hypoxemia, resolved  -transient RA sat 89% , has been 96-98% since   -CXR Moderate edema pattern. No consolidative pneumonia. Chronic findings as above   -NTproBNP 4500  -COVID and influenza NEG  -likely more CHF than PNA.  Continue empiric abx, transtion to PO at discharge  -Continue Lasix 20 IV twice daily, continue empiric antibiotics  Echo reviewed  -Echo pending  -DC planning for tomorrow if Echo okay, remains off oxygen   -Creatinine mildly up, recheck tomorrow    AMS / encephalopathy  Dementia  -initial AMS likely from acute illness, transient hypoxemia and owning from change in environment  -CT head nothing acute.    -mood stable today  -Removed restraints  -continue seroquel at HS      HTN  CAD  -continue PTA meds  -IV hydralazine prn      Medical Decision Making:   I personally reviewed labs:  CBC, BMP  I personally reviewed imaging:  Toxic drug monitoring:   Discussed case with: , lead RN during today's interdisciplinary rounds.    Central Line:      Code status: DNR  Prophylaxis: Lovenox    Subjective:     Chief Complaint / Reason for Physician Visit  Follow up of CHF vs PNA, dementia  Discussed with RN events overnight.   Remains on room air.  Daughter at the bedside    Objective:     VITALS:   Most recent are:  Visit Vitals  BP (!) 157/71   Pulse 53   Temp 97.3 °F (36.3 °C) (Oral)   Resp 19   Ht 1.829 m (6' 0.01\")   Wt 81.2 kg (179 lb)   SpO2 92%   BMI 24.27 kg/m²       I had a face to face encounter and independently examined this patient on 2024, as outlined below:  PHYSICAL EXAM:  General: WD, WN. Alert, cooperative, no acute distress    EENT:  EOMI. Anicteric sclerae. MMM  Resp:  CTA bilaterally, no wheezing or

## 2024-01-04 NOTE — PLAN OF CARE
Problem: Physical Therapy - Adult  Goal: By Discharge: Performs mobility at highest level of function for planned discharge setting.  See evaluation for individualized goals.  Description: FUNCTIONAL STATUS PRIOR TO ADMISSION: The patient was modified independent self propelling a WC. Per family member patient furniture walks short distance from bathroom door to commode.     HOME SUPPORT PRIOR TO ADMISSION: The patient resides at [facility].    Physical Therapy Goals  Initiated 1/4/2024  1.  Patient will move from supine to sit and sit to supine in bed with independence within 7 day(s).    2.  Patient will perform sit to stand with modified independence within 7 day(s).  3.  Patient will transfer from bed to chair and chair to bed with modified independence using the least restrictive device within 7 day(s).  4.  Patient will ambulate with modified independence for 15 feet with the least restrictive device within 7 day(s).     Outcome: Progressing   PHYSICAL THERAPY EVALUATION    Patient: Israel Childers Jr. (89 y.o. male)  Date: 1/4/2024  Primary Diagnosis: Acute respiratory failure with hypoxia ([facility]) [J96.01]  CHF (congestive heart failure), NYHA class I, acute on chronic, combined ([facility]) [I50.43]  Altered mental status, unspecified altered mental status type [R41.82]  Hypervolemia, unspecified hypervolemia type [E87.70]       Precautions: Modified Diet, Fall Risk (pureed)                    ASSESSMENT :   DEFICITS/IMPAIRMENTS:   The patient is limited by decreased functional mobility, activity tolerance, balance S/P admission for hypervolemia and AMS. He is received out of wrist restraints. Patient has a hx of dementia. He is able to follow commands to sit EOB and demonstrates good sitting balance. Patient is marilyn to stand with one assist. He ambulates around the bed with Min Ax2 provided through bilateral HHA. He returns to supine with VC.     Functional Outcome Measure:  The patient scored 18/24 on the [facility]

## 2024-01-04 NOTE — PROGRESS NOTES
Spiritual Care Partner Volunteer visited patient at [facility] in [facility] 2 CARDIAC MEDICAL STEP DOWN on 1/4/2024   Documented by:    YOGI Samayoa  [facility]   Paging Service [phone])

## 2024-01-04 NOTE — PLAN OF CARE
Problem: Safety - Medical Restraint  Goal: Remains free of injury from restraints (Restraint for Interference with Medical Device)  Description: INTERVENTIONS:  1. Determine that other, less restrictive measures have been tried or would not be effective before applying the restraint  2. Evaluate the patient's condition at the time of restraint application  3. Inform patient/family regarding the reason for restraint  4. Q2H: Monitor safety, psychosocial status, comfort, nutrition and hydration  1/3/2024 2018 by Kyara Toure, RN  Outcome: Not Progressing  Flowsheets (Taken 1/3/2024 1943)  Remains free of injury from restraints (restraint for interference with medical device): Determine that other, less restrictive measures have been tried or would not be effective before applying the restraint  1/3/2024 1816 by Kyara Toure, RN  Outcome: Progressing  Flowsheets (Taken 1/3/2024 1200)  Remains free of injury from restraints (restraint for interference with medical device): Determine that other, less restrictive measures have been tried or would not be effective before applying the restraint  1/3/2024 1104 by Kyara Toure, RN  Outcome: Progressing  Flowsheets  Taken 1/3/2024 1000  Remains free of injury from restraints (restraint for interference with medical device): Determine that other, less restrictive measures have been tried or would not be effective before applying the restraint  Taken 1/3/2024 0800  Remains free of injury from restraints (restraint for interference with medical device): Determine that other, less restrictive measures have been tried or would not be effective before applying the restraint  1/3/2024 0702 by Johanne Montenegro, RN  Outcome: Progressing     Problem: Safety - Adult  Goal: Free from fall injury  1/3/2024 2018 by Kyara Toure, RN  Outcome: Not Progressing  1/3/2024 1816 by Kyara Toure, RN  Outcome: Progressing  Flowsheets (Taken 1/3/2024 1109)  Free From  Progressing  1/3/2024 1816 by Kyara Toure RN  Outcome: Progressing  1/3/2024 1104 by Kyara Toure RN  Outcome: Progressing  Flowsheets (Taken 1/3/2024 0722)  Care Plan - Patient's Chronic Conditions and Co-Morbidity Symptoms are Monitored and Maintained or Improved: Monitor and assess patient's chronic conditions and comorbid symptoms for stability, deterioration, or improvement     Problem: Skin/Tissue Integrity  Goal: Absence of new skin breakdown  Description: 1.  Monitor for areas of redness and/or skin breakdown  2.  Assess vascular access sites hourly  3.  Every 4-6 hours minimum:  Change oxygen saturation probe site  4.  Every 4-6 hours:  If on nasal continuous positive airway pressure, respiratory therapy assess nares and determine need for appliance change or resting period.  1/3/2024 2018 by Kyara Toure RN  Outcome: Not Progressing  1/3/2024 1816 by Kyara Toure RN  Outcome: Progressing     Problem: Confusion  Goal: Confusion, delirium, dementia, or psychosis is improved or at baseline  Description: INTERVENTIONS:  1. Assess for possible contributors to thought disturbance, including medications, impaired vision or hearing, underlying metabolic abnormalities, dehydration, psychiatric diagnoses, and notify attending LIP  2. [place] high risk fall precautions, as indicated  3. Provide frequent short contacts to provide reality reorientation, refocusing and direction  4. Decrease environmental stimuli, including noise as appropriate  5. Monitor and intervene to maintain adequate nutrition, hydration, elimination, sleep and activity  6. If unable to ensure safety without constant attention obtain sitter and review sitter guidelines with assigned personnel  7. Initiate Psychosocial CNS and Spiritual Care consult, as indicated  1/3/2024 2018 by Kyara Toure RN  Outcome: Not Progressing  1/3/2024 1816 by Kyara Toure RN  Outcome: Progressing

## 2024-01-04 NOTE — CARE COORDINATION
Care Management Initial Assessment       RUR:  12%   Readmission? No  1st IM letter given? Yes - 1/3/24  1st  letter given: No         01/04/24 4378   Service Assessment   Patient Orientation Unable to Assess   Cognition Dementia / Early Alzheimer's   History Provided By Child/Family   Primary Caregiver Other (Comment)  (staff at the [facility])   Support Systems Other (Comment);Children  (staff at the [facility])   Patient's Healthcare Decision Maker is: Legal Next of Kin   PCP Verified by CM Yes  (Pt sees the PCP at the [facility].)   Last Visit to PCP Within last 3 months   Prior Functional Level Assistance with the following:;Bathing;Dressing;Toileting;Feeding;Cooking;Housework;Shopping   Current Functional Level Assistance with the following:;Bathing;Dressing;Toileting;Feeding;Cooking;Housework;Shopping;Mobility   Can patient return to prior living arrangement Yes   Family able to assist with home care needs: Other (comment)  (staff at the [facility] and daughter)   Financial Resources Medicare   Community Resources Assisted Living  ([facility])   Social/Functional History   Lives With Home care staff   Type of Home Assisted living   Home Equipment Wheelchair-manual   Active  No   Discharge Planning   Type of Residence Assisted living   Condition of Participation: Discharge Planning   The Patient and/or Patient Representative was provided with a Choice of Provider? Patient Representative   Name of the Patient Representative who was provided with the Choice of Provider and agrees with the Discharge Plan?  Flavia Hidalgo   The Patient and/Or Patient Representative agree with the Discharge Plan? Yes   Freedom of Choice list was provided with basic dialogue that supports the patient's individualized plan of care/goals, treatment preferences, and shares the quality data associated with the providers?  Yes         CM spoke with pt's daughter via phone to introduce self/role, verify demographics and  complete initial assessment.  Pt has resided at the [facility] since 3/2020.  Pt needs assistance with his ADLs/IADLs.  Pt uses a w/c.  Pt obtains his medications from the [facility].  Pt will need BLS transport at d/c.  Pt is a DNR but does not have any ACD.  Pt's daughter is open to therapists' suggestion of HH at d/c.  FOC offered, no preference.     Advance Care Planning     General Advance Care Planning (ACP) Conversation    Date of Conversation: 1/4/24  Conducted with: Patient with Decision Making Capacity   Healthcare Decision Maker: Next of Kin by law (only applies in absence of above) (name) Flavia Hidalgo    Healthcare Decision Maker:  No healthcare decision makers have been documented.  Click here to complete HealthCare Decision Makers including selection of the Healthcare Decision Maker Relationship (ie \"Primary\")   Today we documented Decision Maker(s) consistent with Legal Next of Kin hierarchy.    Content/Action Overview:  DECLINED ACP Conversation - will revisit periodically  Reviewed DNR/DNI and patient confirms current DNR status - completed forms on file (place new order if needed)      Length of Voluntary ACP Conversation in minutes:  <16 minutes (Non-Billable)    Raina Pearl LMSW  Supervisee in Social Work  Care Management, [facility]  [phone]

## 2024-01-04 NOTE — PLAN OF CARE
Problem: Occupational Therapy - Adult  Goal: By Discharge: Performs self-care activities at highest level of function for planned discharge setting.  See evaluation for individualized goals.  Description: FUNCTIONAL STATUS PRIOR TO ADMISSION:  Pt lives at [facility], per dtr, was ambulatory without AD or assist within room and could toilet without assist, but would furniture surf. Utilizes w/c outside of room and self propels with BLEs. Assist required for bathing, dressing.    HOME SUPPORT: support from [facility] staff    Occupational Therapy Goals:  Initiated 1/4/2024  1.  Patient will perform toileting with Supervision within 7 day(s).  2.  Patient will perform toilet transfer with Supervision within 7 day(s).  3.  Patient will perform functional ambulation short distances with CGA with no AD within 7 day(s).  4.  Patient will perform UB dressing with Set-up  within 7 day(s).  5.  Patient will perform grooming in stance with Supervision within 7 day(s).    Outcome: Progressing   OCCUPATIONAL THERAPY EVALUATION    Patient: Israel Childers Jr. (89 y.o. male)  Date: 1/4/2024  Primary Diagnosis: Acute respiratory failure with hypoxia ([facility]) [J96.01]  CHF (congestive heart failure), NYHA class I, acute on chronic, combined ([facility]) [I50.43]  Altered mental status, unspecified altered mental status type [R41.82]  Hypervolemia, unspecified hypervolemia type [E87.70]         Precautions: Modified Diet, Fall Risk (pureed)                  ASSESSMENT :  The patient is limited by decreased functional mobility, independence in ADLs, activity tolerance, endurance, coordination, balance.    Based on the impairments listed above pt is presenting below his baseline, currently requiring up to min assist x1-2 for ADLs and set up-mod assist for ADLs. Pt tolerated evaluation well. Pt has baseline dementia with impaired short term memory, orientation, attention/concentration, but able to follow simple commands consistently and presented with calm

## 2024-01-04 NOTE — PROGRESS NOTES
[x]Other: sitting up in hospital bed, awake, alert, able to answer simple questions, able to take sip from cup straw on his own, no restlessness or agitation  Mental Status: [] Normal mental status exam  [] Drowsy  [] Confused  []Other:  Cardiovascular: [] Regular rate/rhythm  [] Arrhythmia  [] Other:  Chest: [] Effort normal  []Lungs clear  [] Respiratory distress  []Tachypnea  [] Other:  Abdomen: [] Soft/non-tender  [] Normal appearance  [] Distended  [] Ascites  [] Other:  Neurological: [] Normal speech  [] Normal sensation  []Deficits present:  Extremity: [] Normal skin color/temp  [] Clubbing/cyanosis  [] No edema  [] Other:    Wt Readings from Last 15 Encounters:   01/03/24 81.2 kg (179 lb)        Current Diet: ADULT DIET; Dysphagia - Pureed; Low Sodium (2 gm)       PSYCHOSOCIAL/SPIRITUAL SCREENING:   Palliative IDT has assessed this patient for cultural preferences / practices and a referral made as appropriate to needs (Cultural Services, Patient Advocacy, Ethics, etc.)    Spiritual Affiliation: Rastafari    Any spiritual / Jehovah's witness concerns:  [] Yes /  [x] No   If \"Yes\" to discuss with pastoral care during IDT     Does caregiver feel burdened by caring for their loved one:   [] Yes /  [] No /  [] No Caregiver Present/Available [] No Caregiver [x] Pt Lives at Facility  If \"Yes\" to discuss with social work during IDT    Anticipatory grief assessment:   [x] Normal  / [] Maladaptive     If \"Maladaptive\" to discuss with social work during IDT    ESAS Anxiety:      ESAS Depression:          LAB AND IMAGING FINDINGS:   Objective data reviewed:  labs, images, records, medication use, vitals, and chart     FINAL COMMENTS   Thank you for allowing Palliative Medicine to participate in the care of Israel Childers .    Only check if applicable and billing time based rather than MDM  [] The total encounter time on this service date was minutes which was spent performing a face-to-face encounter and personally  completing the provider-level activities documented in the note. This includes time spent prior to the visit and after the visit in direct care of the patient. This time does not include time spent in any separately reportable services.    Electronically signed by   Tianna Smith MD  Palliative Care Team  on 1/4/2024 at 12:51 PM

## 2024-01-05 VITALS
OXYGEN SATURATION: 90 % | BODY MASS INDEX: 24.24 KG/M2 | SYSTOLIC BLOOD PRESSURE: 163 MMHG | WEIGHT: 179 LBS | HEIGHT: 72 IN | TEMPERATURE: 98 F | RESPIRATION RATE: 18 BRPM | DIASTOLIC BLOOD PRESSURE: 63 MMHG | HEART RATE: 67 BPM

## 2024-01-05 LAB
ANION GAP SERPL CALC-SCNC: 3 MMOL/L (ref 5–15)
BUN SERPL-MCNC: 29 MG/DL (ref 6–20)
BUN/CREAT SERPL: 19 (ref 12–20)
CALCIUM SERPL-MCNC: 8.7 MG/DL (ref 8.5–10.1)
CHLORIDE SERPL-SCNC: 105 MMOL/L (ref 97–108)
CO2 SERPL-SCNC: 33 MMOL/L (ref 21–32)
CREAT SERPL-MCNC: 1.54 MG/DL (ref 0.7–1.3)
GLUCOSE SERPL-MCNC: 105 MG/DL (ref 65–100)
POTASSIUM SERPL-SCNC: 3.1 MMOL/L (ref 3.5–5.1)
SODIUM SERPL-SCNC: 141 MMOL/L (ref 136–145)

## 2024-01-05 PROCEDURE — 2580000003 HC RX 258: Performed by: INTERNAL MEDICINE

## 2024-01-05 PROCEDURE — 6360000002 HC RX W HCPCS: Performed by: INTERNAL MEDICINE

## 2024-01-05 PROCEDURE — 6370000000 HC RX 637 (ALT 250 FOR IP): Performed by: INTERNAL MEDICINE

## 2024-01-05 PROCEDURE — 36415 COLL VENOUS BLD VENIPUNCTURE: CPT

## 2024-01-05 PROCEDURE — 6360000002 HC RX W HCPCS: Performed by: NURSE PRACTITIONER

## 2024-01-05 PROCEDURE — 80048 BASIC METABOLIC PNL TOTAL CA: CPT

## 2024-01-05 RX ORDER — POTASSIUM CHLORIDE 1500 MG/1
20 TABLET, EXTENDED RELEASE ORAL DAILY
Qty: 60 TABLET | Refills: 3 | Status: SHIPPED | OUTPATIENT
Start: 2024-01-06

## 2024-01-05 RX ORDER — AZITHROMYCIN 500 MG/1
500 TABLET, FILM COATED ORAL DAILY
Qty: 3 TABLET | Refills: 0 | Status: SHIPPED | OUTPATIENT
Start: 2024-01-05 | End: 2024-01-08

## 2024-01-05 RX ORDER — HALOPERIDOL 5 MG/ML
1 INJECTION INTRAMUSCULAR ONCE
Status: COMPLETED | OUTPATIENT
Start: 2024-01-05 | End: 2024-01-05

## 2024-01-05 RX ORDER — FUROSEMIDE 20 MG/1
20 TABLET ORAL DAILY
Qty: 15 TABLET | Refills: 0 | Status: SHIPPED | OUTPATIENT
Start: 2024-01-05 | End: 2024-01-20

## 2024-01-05 RX ORDER — CEFDINIR 300 MG/1
300 CAPSULE ORAL DAILY
Qty: 4 CAPSULE | Refills: 0 | Status: SHIPPED | OUTPATIENT
Start: 2024-01-05 | End: 2024-01-09

## 2024-01-05 RX ADMIN — CEFTRIAXONE SODIUM 1000 MG: 1 INJECTION, POWDER, FOR SOLUTION INTRAMUSCULAR; INTRAVENOUS at 12:07

## 2024-01-05 RX ADMIN — METOPROLOL TARTRATE 25 MG: 25 TABLET, FILM COATED ORAL at 12:24

## 2024-01-05 RX ADMIN — ASPIRIN 81 MG: 81 TABLET, COATED ORAL at 12:13

## 2024-01-05 RX ADMIN — AMLODIPINE BESYLATE 5 MG: 5 TABLET ORAL at 12:13

## 2024-01-05 RX ADMIN — POTASSIUM CHLORIDE 20 MEQ: 750 TABLET, FILM COATED, EXTENDED RELEASE ORAL at 12:13

## 2024-01-05 RX ADMIN — ENOXAPARIN SODIUM 40 MG: 100 INJECTION SUBCUTANEOUS at 12:13

## 2024-01-05 RX ADMIN — HALOPERIDOL LACTATE 1 MG: 5 INJECTION, SOLUTION INTRAMUSCULAR at 01:39

## 2024-01-05 RX ADMIN — CLOPIDOGREL BISULFATE 75 MG: 75 TABLET ORAL at 12:13

## 2024-01-05 RX ADMIN — SODIUM CHLORIDE, PRESERVATIVE FREE 10 ML: 5 INJECTION INTRAVENOUS at 12:14

## 2024-01-05 NOTE — PROGRESS NOTES
Chief complaint recheck polypectomy site    Subjective     Patient is a 62 y.o. male who presents today for a follow-up colonoscopy.  He had 3 polyps removed during colonoscopy on February 8, 2022, two of which were tubular adenomas.  The larger polypectomy site was tattooed for future identification. This was in the cecum.  Diverticulosis and internal hemorrhoids were also discovered.  He denies constipation, diarrhea, and BRBPR.  No family history of colon cancer.  He denies current symptoms.  Family history is negative for GI disease.  Medical, surgical, and social histories were reviewed and are listed below.      Review of Systems  Review of Systems - General ROS: negative for - weight loss  Psychological ROS: negative for - behavioral disorder  Ophthalmic ROS: negative for - dry eyes  ENT ROS: negative for - vertigo or vocal changes  Hematological and Lymphatic ROS: negative for - jaundice or swollen lymph nodes  Respiratory ROS: negative for - sputum changes or stridor  Cardiovascular ROS: negative for - irregular heartbeat or murmur  Gastrointestinal ROS: negative for - blood in stools or change in stools  Genitourinary ROS: negative for - hematuria or incontinence  Musculoskeletal ROS: negative for - gait disturbance      History  Past Medical History:   Diagnosis Date   • Asthma    • Depression    • Elevated cholesterol    • GERD (gastroesophageal reflux disease)    • Sleep apnea      Past Surgical History:   Procedure Laterality Date   • COLONOSCOPY N/A 12/8/2017    Procedure: COLONOSCOPY FOR SCREENING  CPTCODE:67644;  Surgeon: Zack Gongora III, MD;  Location: [facility];  Service:    • COLONOSCOPY N/A 12/8/2017    Procedure: COLONOSCOPY;  Surgeon: Zack Gongora III, MD;  Location: [facility];  Service:    • COLONOSCOPY N/A 2/8/2022    Procedure: COLONOSCOPY;  Surgeon: Paulina Vanegas MD;  Location: [facility];  Service: Gastroenterology;  Laterality: N/A;  Tattoed biopsy area  Nursing notified me patient was in restraints night prior, states is due to go back to [facility] in the AM. He is exhibiting similar behaviors as the prior night of pulling lines, agitation, grabbed one of the staff members arms and was not wanting to let go per nursing. Pt did receive his seroquel earlier. No other concerns reported. Ordered additional x1 dose seroquel, however nurse reported pt refused to take this po. Nursing interventions to redirect unsuccessful. No acute distress noted. VSS. No other concerns reported by patient or nursing at this time.  At this time due to patient being risk of harm towards themselfs and staff will initiate haldol 1mg x1, as he is not wanting to take the seroquel and will defer initiation of the restraints at this time as d/w nursing. Nursing to notify Hospitalist for further/continued concerns. Will remain available overnight for further concerns if nursing/patient needs. Will defer further evaluation/management to the day shift team.    Non-billable note.     at cecum, per Dr. Palafox   • KNEE ACL RECONSTRUCTION     • ROTATOR CUFF REPAIR     • TONSILLECTOMY       History reviewed. No pertinent family history.  Social History     Tobacco Use   • Smoking status: Former Smoker     Quit date:      Years since quittin.6   • Smokeless tobacco: Former User   Vaping Use   • Vaping Use: Never used   Substance Use Topics   • Alcohol use: Yes     Comment: ocassional   • Drug use: No     Medications Prior to Admission   Medication Sig Dispense Refill Last Dose   • ARIPiprazole (ABILIFY) 20 MG tablet Take  by mouth Daily.      • atorvastatin (LIPITOR) 10 MG tablet Take 10 mg by mouth Daily.      • bisacodyl (Dulcolax) 5 MG EC tablet Take 4 tablets at 4pm the day prior to procedure with a full glass of water. 4 tablet 0    • busPIRone (BUSPAR) 15 MG tablet Take 15 mg by mouth 3 (Three) Times a Day.      • magnesium citrate solution Take 296 mL by mouth Take As Directed. Follow bowel prep instructions given at office 592 mL 0    • venlafaxine XR (EFFEXOR-XR) 150 MG 24 hr capsule Take 1 capsule by mouth.        Allergies:  Penicillins    Objective     Vital Signs       Physical Exam  General:  This is a WD pleasant male in no acute distress  Vital signs: Stable, afebrile  HEENT exam:  WNL. Sclerae are anicteric.  EOMI  Neck: Supple, FROM.  No JVD.  Trachea midline  Lungs:  Respiratory effort normal. Auscultation: Clear, without wheezes, rhonchi, rales  Heart:  Regular rate and rhythm, without murmur, gallop, rub.  No pedal edema  Abdomen:  No tenderness or palpable masses;  Bowel sounds normal  Musculoskeletal: Muscle strength/tone is normal.    Psych:  Alert, oriented x 3.  Mood and affect are appropriate  Skin:  Warm with good turgor.  Without rash or lesion  Extremities:  Examination of the extremities revealed no cyanosis, clubbing or edema.    Results Review:                     Invalid input(s): PROTCrCl cannot be calculated (No successful lab value found.).  No results  found for: AMMONIA      No results found for: BLOODCX  No results found for: URINECX  No results found for: WOUNDCX  No results found for: STOOLCX    Imaging:  Imaging Results (Last 24 Hours)     ** No results found for the last 24 hours. **                Impression:  Patient Active Problem List   Diagnosis Code   • Colon cancer screening Z12.11   • Mass of right wrist R22.31   • Preoperative clearance Z01.818   • Personal history of colonic polyps Z86.010       Assessment:  1.  Personal history of tubular adenomatous colonic polyps    Plan:  The patient will undergo repeat colonoscopy today for a recheck of a polyp found in the right colon that may not have been completely removed.  It was removed in a piecemeal fashion.  He voiced understanding and agreement of the procedure.    MAXIMUS Barnett  09/06/22  09:18 EDT

## 2024-01-05 NOTE — PROGRESS NOTES
Patient became increasingly agitated about 2300.  Patient refused midnight vitals. Patient stated he did not want anything and was fine.  Hospitalist contacted.  Nurse attempted to give patient 1 time ordered dose of seroquel crushed in apple sauce.  Patient repeatedly refused med.  Patient also refused vital signs again.  Hospitalist contracted.

## 2024-01-05 NOTE — CARE COORDINATION
Transition of Care Plan:    RUR: 12  Prior Level of Functioning: Needs A with ADLs  Lives at [facility] in [place].   Disposition: Return to [facility]   HH: [facility]  RN will call report: [phone] ask for Jesica before 3 PM  or ask for Med Tech in charge.   BLS transport [facility]: ETA 3:30 PM to 4 PM     1:21 PM   CM talked to [facility] transport and they can come 3:30 PM to 4 PM.  PCS on bedside chart.     CM canceled [facility] transport since it was late.     Still waiting on DC order and Summary.  CM sent perfect serve to MD to make him aware of transport time.     Pt sees PCP: Ana Maria [phone] and he comes to the [facility].     12:58 PM   CM talked to MD and he is going to DC Pt today.   Jesica, DON and let her know that I will fax DC Summary once it is in.  They can do labs early on Tuesday Morning if needed.   CM tried to set up [facility] but they quoted ETA at 1945.  CM working to see if there is another transport company that can come sooner.     11:24 AM  CM participated in rounds and Hospitalist is planning on DC today if labs are good.   CM talked to Pt DTR in room and delivered 2nd IM letter.   Three  agencies accepted.  CM provided FOC for [facility], [facility] and [facility].  Pt DTR selected: [facility].  CM will send update to HH agencies and send DC summary once it is completed.     CM faxed information to [facility] as requested.     Pt will need BLS transport. CM sent referral via [facility] to [facility] requesting 2 PM  - pending.     Will need DC Summary and DC order prior to DC    8:48 AM  CM completed chart review.   CM called [facility]: Talked to DON: [phone] and they would like clinicals faxed today to review.  They don't like to admit over the weekend.  CM will fax clinicals to [phone].   CM checked HH referrals and [facility] is not in network.  CM will send out referrals to see who can accept this Pt insurance. HH- Pending    If  can accept this Pt insurance. HH- Pending    If SNF or IPR: Date FOC offered:   Date FOC received:   Accepting facility:   Date authorization started with reference number:   Date authorization received and expires:     Follow up appointments: PCP: Ramiro:   DAVID needed: n/a  Transportation at discharge: BLS?  IM/IMM Medicare/ letter given: 2nd IM letter will need to be delivered.   Is patient a [place] and connected with [facility]? N/a   If yes, was [place] transfer form completed and [facility] notified?   Caregiver Contact: DTR: Flavia Hidalgo: [phone]  Discharge Caregiver contacted prior to discharge? yes  Care Conference needed? N/a  Barriers to discharge: Card Clearance  Echo read    Cheyanne Dinero, TONY  Ext [phone]

## 2024-01-05 NOTE — PROGRESS NOTES
Physician Progress Note      PATIENT:               GRECIA NARAYANAN  CSN #:                  [id number]  :                       5/10/1929  ADMIT DATE:       2024 12:58 PM  DISCH DATE:        2024 4:16 PM  RESPONDING  PROVIDER #:        Brien Denny MD          QUERY TEXT:    Dr Denny  Pt admitted with AMS, AHRF and has CHF documented. If possible, please   document in progress notes and discharge summary further specificity regarding   the type and acuity of CHF:      The medical record reflects the following:  Risk Factors: AHRF, HTN, CAD,  Clinical Indicators: ECHO EF of 55 - 60%. Left ventricle size is normal.   Increased wall thickness. Normal wall motion. Diastolic dysfunction present   with normal LV EF.  Treatment: Lasix, Echo noted above, daily weights, strict I/Os  Options provided:  -- Acute on Chronic Systolic and Diastolic CHF  -- Other - I will add my own diagnosis  -- Disagree - Not applicable / Not valid  -- Disagree - Clinically unable to determine / Unknown  -- Refer to Clinical Documentation Reviewer    PROVIDER RESPONSE TEXT:    This patient is in acute on chronic systolic and diastolic CHF.    Query created by: Ashley Sanders on 2024 3:16 PM      Electronically signed by:  Brien Dneny MD 2024 5:02 PM

## 2024-01-05 NOTE — DISCHARGE SUMMARY
Discharge Summary    Name: Israel Childers Jr.  [id number]  YOB: 1929 (Age: 89 y.o.)   Date of Admission: 1/2/2024  Date of Discharge: 1/5/2024  Attending Physician: Brien Denny MD    Discharge Diagnosis:   Mild Diastolic HF  Hypoxemia, resolved  AMS / encephalopathy  Dementia  HTN  CAD    Consultations:  IP CONSULT TO PALLIATIVE CARE  IP CONSULT HOME HEALTH      Brief Admission History/Reason for Admission Per Sam Kaufman MD:   Israel Childers is a 89 y.o.  male with PMHx significant for hypertension, CAD, history of prostate cancer, hypercholesterolemia presented to the hospital for evaluation of chest muscle status associated with Started about couple days ago, patient denying chest pain, blood work was done was significant for elevated white blood cell count 12.6, chest x-ray was done showing moderate edema.    We were asked to admit for work up and evaluation of the above problems.     Brief Hospital Course by Main Problems:   CHF vs PNA  Hypoxemia, resolved  -transient RA sat 89% , has been 96-98% since   -CXR Moderate edema pattern. No consolidative pneumonia. Chronic findings as above   -NTproBNP 4500  -COVID and influenza NEG  -likely more CHF than PNA.   -on lasix 20 IV BID, change to lasix 20 po daily  Echo reviewed, mild diastolic dysfunction  Creat up from creat, expected. Should recheck within 3-5 days at [facility]     AMS / encephalopathy  Dementia  -initial AMS likely from acute illness, transient hypoxemia and sundowning from change in environment  -CT head nothing acute.    -episode of sundowning while in hospital. No further work up needed in hospital setting. Pt may get more confused/agitated in hospital.   Would continue seroquel at [facility].        HTN  CAD  -continue PTA meds  -IV hydralazine prn    Discharge Exam:  Patient seen and examined by me on discharge day.  Pertinent Findings:  Patient Vitals for the past 24 hrs:   BP Temp Temp src Pulse Resp

## 2024-01-05 NOTE — DISCHARGE INSTRUCTIONS
HOSPITALIST DISCHARGE INSTRUCTIONS    NAME: Israel Childers Jr.   :  5/10/1929   MRN:  [id number]     Date/Time:  2024 1:26 PM    ADMIT DATE: 2024   DISCHARGE DATE: 2024     Mild Diastolic HF  Hypoxemia, resolved  AMS / encephalopathy  Dementia  HTN  CAD    It is important that you take the medication exactly as they are prescribed.   Keep your medication in the bottles provided by the pharmacist and keep a list of the medication names, dosages, and times to be taken in your wallet.   Do not take other medications without consulting your doctor.       What to do at Home    Recommended diet:  cardiac diet    Recommended activity: activity as tolerated      If you have questions regarding the hospital related prescriptions or hospital related issues please call [facility]' office at . You can always direct your questions to your primary care doctor if you are unable to reach your hospital physician; your PCP works as an extension of your hospital doctor just like your hospital doctor is an extension of your PCP for your time at the hospital ([facility])    If you experience any of the following symptoms then please call your primary care physician or return to the emergency room if you cannot get hold of your doctor:    Fever, chills, nausea, vomiting, or persistent diarrhea  Worsening weakness or new problems with your speech or balance  Dark stools or visible blood in your stools  New Leg swelling or shortness of breath as these could be signs of a clot    Additional Instructions:      Bring these papers with you to your follow up appointments. The papers will help your doctors be sure to continue the care plan from the hospital.              Information obtained by :  I understand that if any problems occur once I am at home I am to contact my physician.    I understand and acknowledge receipt of the instructions indicated above.

## 2024-01-05 NOTE — ADT AUTH CERT
Arvind Ferrari MD  Physician  General Surgery  Progress Notes     Addendum  Date of Service:  1/3/2024 11:06 AM                                                                                                                                                                              Hospitalist Progress Note     NAME:            Israel Childers Jr.   :   5/10/1929   MRN:   [id number]      Date/Time: 1/3/2024 11:06 AM  Patient PCP: Jamal Rubio MD     Estimated discharge date:    Anticipated Disposition / Barriers:       Assessment / Plan:      CHF vs PNA  Hypoxemia, resolved  -transient RA sat 89% yesterday, has been 96-98% since   -CXR Moderate edema pattern. No consolidative pneumonia. Chronic findings as above   -NTproBNP 4500  -COVID and influenza NEG  -likely more CHF than PNA.  Continue empiric abx, transtion to PO at discharge  -start IV lasix BID.  Plan DC on po lasix   -Echo pending  -DC planning for tomorrow if Echo okay, remains off oxygen      AMS / encephalopathy  Dementia  -initial AMS likely from acute illness, transient hypoxemia and ing from change in environment  -CT head nothing acute.    -mood stable today  -continue  but will remove mittens  -continue seroquel at HS  -hopefully can return to [facility] tomorrow     HTN  CAD  -continue PTA meds  -IV hydralazine prn        Medical Decision Making:   I personally reviewed labs:  CBC, BMP  I personally reviewed imaging:  Toxic drug monitoring:   Discussed case with: , lead RN during today's interdisciplinary rounds.     Central Line:       Code status: DNR  Prophylaxis: Lovenox     Subjective:      Chief Complaint / Reason for Physician Visit  Follow up of CHF vs PNA, dementia  Discussed with RN events overnight.   Daughter at bedside     Objective:      VITALS:   Most recent are:  Visit Vitals  BP (!) 150/99   Pulse 68   Temp 97.7 °F (36.5 °C) (Axillary)   Resp 14   Ht 1.829 m (6' 0.01\")   Wt 81.2 kg (179 lb)  reviewed - no change compared to H&P     ________________________________________________________________________  Care Plan discussed with:      Comments   Patient x     Family  x     RN x     Care Manager       Consultant                           Multidiciplinary team rounds were held today with , nursing, pharmacist and clinical coordinator.  Patient's plan of care was discussed; medications were reviewed and discharge planning was addressed.     ________________________________________________________________________  Total NON critical care TIME:   35  Minutes     Total CRITICAL CARE TIME Spent:   Minutes non procedure based         Comments   >50% of visit spent in counseling and coordination of care x       This includes time during multidisciplinary rounds if indicated above   ________________________________________________________________________  Brien Denny MD      Procedures: see electronic medical records for all procedures/Xrays and details which were not copied into this note but were reviewed prior to creation of Plan.       LABS:  I reviewed today's most current labs and imaging studies.  Pertinent labs include:       Recent Labs     01/02/24  1319 01/03/24  0645   WBC 12.6* 10.1   HGB 12.1 11.3*   HCT 37.0 34.7*    228               Recent Labs     01/02/24  1319 01/03/24  0645 01/04/24  0614    142 141   K 3.6 3.8 3.2*    106 103   CO2 31 34* 30   BUN 19 16 20   MG 1.9  --  2.1   ALT 15  --   --                       Tianna Smith MD  Physician  Palliative Care  Progress Notes     Addendum  Date of Service:  1/4/2024 12:43 PM